# Patient Record
Sex: MALE | Race: WHITE | NOT HISPANIC OR LATINO | Employment: FULL TIME | URBAN - METROPOLITAN AREA
[De-identification: names, ages, dates, MRNs, and addresses within clinical notes are randomized per-mention and may not be internally consistent; named-entity substitution may affect disease eponyms.]

---

## 2024-01-24 DIAGNOSIS — E78.2 MIXED HYPERLIPIDEMIA: Primary | ICD-10-CM

## 2024-01-24 DIAGNOSIS — I10 PRIMARY HYPERTENSION: ICD-10-CM

## 2024-01-24 RX ORDER — ATORVASTATIN CALCIUM 10 MG/1
10 TABLET, FILM COATED ORAL DAILY
Qty: 90 TABLET | Refills: 3 | Status: SHIPPED | OUTPATIENT
Start: 2024-01-24 | End: 2024-02-23 | Stop reason: SDUPTHER

## 2024-01-24 RX ORDER — ATENOLOL AND CHLORTHALIDONE TABLET 50; 25 MG/1; MG/1
1 TABLET ORAL DAILY
Qty: 90 TABLET | Refills: 3 | Status: SHIPPED | OUTPATIENT
Start: 2024-01-24 | End: 2024-02-23 | Stop reason: SDUPTHER

## 2024-02-08 ENCOUNTER — APPOINTMENT (OUTPATIENT)
Dept: PRIMARY CARE | Facility: CLINIC | Age: 59
End: 2024-02-08

## 2024-02-23 ENCOUNTER — OFFICE VISIT (OUTPATIENT)
Dept: PRIMARY CARE | Facility: CLINIC | Age: 59
End: 2024-02-23
Payer: COMMERCIAL

## 2024-02-23 ENCOUNTER — LAB (OUTPATIENT)
Dept: LAB | Facility: LAB | Age: 59
End: 2024-02-23
Payer: COMMERCIAL

## 2024-02-23 VITALS
OXYGEN SATURATION: 95 % | DIASTOLIC BLOOD PRESSURE: 82 MMHG | HEART RATE: 55 BPM | SYSTOLIC BLOOD PRESSURE: 118 MMHG | BODY MASS INDEX: 44.81 KG/M2 | HEIGHT: 70 IN | WEIGHT: 313 LBS

## 2024-02-23 DIAGNOSIS — Z00.00 ROUTINE GENERAL MEDICAL EXAMINATION AT A HEALTH CARE FACILITY: Primary | ICD-10-CM

## 2024-02-23 DIAGNOSIS — Z13.220 LIPID SCREENING: ICD-10-CM

## 2024-02-23 DIAGNOSIS — Z12.5 SCREENING PSA (PROSTATE SPECIFIC ANTIGEN): ICD-10-CM

## 2024-02-23 DIAGNOSIS — I10 PRIMARY HYPERTENSION: ICD-10-CM

## 2024-02-23 DIAGNOSIS — E78.2 MIXED HYPERLIPIDEMIA: ICD-10-CM

## 2024-02-23 DIAGNOSIS — Z00.00 ROUTINE GENERAL MEDICAL EXAMINATION AT A HEALTH CARE FACILITY: ICD-10-CM

## 2024-02-23 DIAGNOSIS — Z13.1 SCREENING FOR DIABETES MELLITUS: ICD-10-CM

## 2024-02-23 LAB
ALBUMIN SERPL BCP-MCNC: 4.7 G/DL (ref 3.4–5)
ALP SERPL-CCNC: 42 U/L (ref 33–120)
ALT SERPL W P-5'-P-CCNC: 40 U/L (ref 10–52)
ANION GAP SERPL CALC-SCNC: 13 MMOL/L (ref 10–20)
AST SERPL W P-5'-P-CCNC: 29 U/L (ref 9–39)
BILIRUB SERPL-MCNC: 0.6 MG/DL (ref 0–1.2)
BUN SERPL-MCNC: 17 MG/DL (ref 6–23)
CALCIUM SERPL-MCNC: 10.1 MG/DL (ref 8.6–10.6)
CHLORIDE SERPL-SCNC: 102 MMOL/L (ref 98–107)
CHOLEST SERPL-MCNC: 121 MG/DL (ref 0–199)
CHOLESTEROL/HDL RATIO: 3.7
CO2 SERPL-SCNC: 34 MMOL/L (ref 21–32)
CREAT SERPL-MCNC: 0.97 MG/DL (ref 0.5–1.3)
EGFRCR SERPLBLD CKD-EPI 2021: 90 ML/MIN/1.73M*2
GLUCOSE SERPL-MCNC: 87 MG/DL (ref 74–99)
HDLC SERPL-MCNC: 32.5 MG/DL
LDLC SERPL CALC-MCNC: 71 MG/DL
NON HDL CHOLESTEROL: 89 MG/DL (ref 0–149)
POTASSIUM SERPL-SCNC: 3.9 MMOL/L (ref 3.5–5.3)
PROT SERPL-MCNC: 7.9 G/DL (ref 6.4–8.2)
PSA SERPL-MCNC: 0.94 NG/ML
SODIUM SERPL-SCNC: 145 MMOL/L (ref 136–145)
TRIGL SERPL-MCNC: 87 MG/DL (ref 0–149)
VLDL: 17 MG/DL (ref 0–40)

## 2024-02-23 PROCEDURE — 36415 COLL VENOUS BLD VENIPUNCTURE: CPT

## 2024-02-23 PROCEDURE — 80061 LIPID PANEL: CPT

## 2024-02-23 PROCEDURE — 3079F DIAST BP 80-89 MM HG: CPT | Performed by: FAMILY MEDICINE

## 2024-02-23 PROCEDURE — 84153 ASSAY OF PSA TOTAL: CPT

## 2024-02-23 PROCEDURE — 80053 COMPREHEN METABOLIC PANEL: CPT

## 2024-02-23 PROCEDURE — 99396 PREV VISIT EST AGE 40-64: CPT | Performed by: FAMILY MEDICINE

## 2024-02-23 PROCEDURE — 3074F SYST BP LT 130 MM HG: CPT | Performed by: FAMILY MEDICINE

## 2024-02-23 PROCEDURE — 1036F TOBACCO NON-USER: CPT | Performed by: FAMILY MEDICINE

## 2024-02-23 RX ORDER — ATORVASTATIN CALCIUM 10 MG/1
10 TABLET, FILM COATED ORAL DAILY
Qty: 90 TABLET | Refills: 3 | Status: SHIPPED | OUTPATIENT
Start: 2024-02-23

## 2024-02-23 RX ORDER — ATENOLOL AND CHLORTHALIDONE TABLET 50; 25 MG/1; MG/1
1 TABLET ORAL DAILY
Qty: 90 TABLET | Refills: 3 | Status: SHIPPED | OUTPATIENT
Start: 2024-02-23

## 2024-02-23 ASSESSMENT — ENCOUNTER SYMPTOMS
MUSCULOSKELETAL NEGATIVE: 1
CONSTITUTIONAL NEGATIVE: 1
HEMATOLOGIC/LYMPHATIC NEGATIVE: 1
NEUROLOGICAL NEGATIVE: 1
RESPIRATORY NEGATIVE: 1
PSYCHIATRIC NEGATIVE: 1
EYES NEGATIVE: 1
ENDOCRINE NEGATIVE: 1
CARDIOVASCULAR NEGATIVE: 1
GASTROINTESTINAL NEGATIVE: 1

## 2024-02-23 ASSESSMENT — PATIENT HEALTH QUESTIONNAIRE - PHQ9
SUM OF ALL RESPONSES TO PHQ9 QUESTIONS 1 AND 2: 0
2. FEELING DOWN, DEPRESSED OR HOPELESS: NOT AT ALL
1. LITTLE INTEREST OR PLEASURE IN DOING THINGS: NOT AT ALL

## 2024-02-23 ASSESSMENT — PAIN SCALES - GENERAL: PAINLEVEL: 0-NO PAIN

## 2024-02-23 NOTE — PROGRESS NOTES
[facility]: [facility]  PHYSICAL EXAM      Yariel Diana is a 58 y.o. male that is presenting today for Med Refill.     Subjective   57 yo CM    PT here for wellness exam.  Overdue for labs and blood pressure assesment. H/o of elevated CAC on lipitor.     Med Refill      Review of Systems   Constitutional: Negative.    HENT: Negative.     Eyes: Negative.    Respiratory: Negative.     Cardiovascular: Negative.    Gastrointestinal: Negative.    Endocrine: Negative.    Genitourinary: Negative.    Musculoskeletal: Negative.    Skin: Negative.    Neurological: Negative.    Hematological: Negative.    Psychiatric/Behavioral: Negative.         History    No past medical history on file.  No past surgical history on file.  No family history on file.  Not on File  Current Outpatient Medications on File Prior to Visit   Medication Sig Dispense Refill    atenoloL-chlorthalidone (Tenoretic) 50-25 mg tablet TAKE 1 TABLET BY MOUTH EVERY DAY 90 tablet 3    atorvastatin (Lipitor) 10 mg tablet TAKE 1 TABLET BY MOUTH EVERY DAY 90 tablet 3     No current facility-administered medications on file prior to visit.     Immunization History   Administered Date(s) Administered    Pfizer Purple Cap SARS-CoV-2 03/31/2021, 04/24/2021     Patient's medical history was reviewed and updated either before or during this encounter.    Objective   Vitals:    02/23/24 0916   BP: 118/82   Pulse: 55   SpO2: 95%      Physical Exam  Constitutional:       General: He is not in acute distress.     Appearance: Normal appearance. He is not ill-appearing.   HENT:      Right Ear: Tympanic membrane, ear canal and external ear normal. There is no impacted cerumen.      Left Ear: Tympanic membrane, ear canal and external ear normal.      Nose: Nose normal.      Mouth/Throat:      Pharynx: Oropharynx is clear. No posterior oropharyngeal erythema.   Eyes:      Extraocular Movements: Extraocular movements intact.      Pupils: Pupils are  equal, round, and reactive to light.   Neck:      Thyroid: No thyroid mass or thyroid tenderness.      Vascular: No carotid bruit.   Cardiovascular:      Rate and Rhythm: Normal rate and regular rhythm.      Pulses:           Radial pulses are 2+ on the right side and 2+ on the left side.        Dorsalis pedis pulses are 2+ on the right side and 2+ on the left side.      Heart sounds: Normal heart sounds. No murmur heard.     No friction rub. No gallop.   Pulmonary:      Effort: Pulmonary effort is normal.      Breath sounds: Normal breath sounds.   Abdominal:      General: Bowel sounds are normal.      Palpations: Abdomen is soft. There is no hepatomegaly or splenomegaly.      Tenderness: There is no abdominal tenderness.   Musculoskeletal:      Cervical back: Normal range of motion. No tenderness.      Right lower leg: No edema.      Left lower leg: No edema.      Comments: No gross abnormalities    Lymphadenopathy:      Cervical: No cervical adenopathy.      Upper Body:      Right upper body: No supraclavicular adenopathy.      Left upper body: No supraclavicular adenopathy.   Skin:     General: Skin is warm.      Capillary Refill: Capillary refill takes 2 to 3 seconds.   Neurological:      General: No focal deficit present.      Mental Status: He is alert.      Cranial Nerves: Cranial nerves 2-12 are intact.      Coordination: Coordination is intact.      Gait: Gait is intact.      Comments: No obvious neurological deficits    Psychiatric:         Mood and Affect: Mood and affect normal.            Assessment/Plan      There is no problem list on file for this patient.    Diagnoses and all orders for this visit:  Routine general medical examination at a health care facility  -     POCT UA Automated manually resulted  -     Comprehensive Metabolic Panel; Future  -     Lipid Panel; Future  -     Prostate Specific Antigen, Screen; Future  Lipid screening  -     Lipid Panel; Future  Screening for diabetes  mellitus  -     Comprehensive Metabolic Panel; Future  Screening PSA (prostate specific antigen)  -     Prostate Specific Antigen, Screen; Future  Primary hypertension  -     atenoloL-chlorthalidone (Tenoretic) 50-25 mg tablet; Take 1 tablet by mouth once daily.  Mixed hyperlipidemia  -     atorvastatin (Lipitor) 10 mg tablet; Take 1 tablet (10 mg) by mouth once daily.  150 min exercise per week,  Tdap, Shingles, flu, covid vaccines recommend.      The patient was encouraged to ensure that any/all documentation is accurate and up to date, and that our office be provided a copy in the event that anything changes.    Brody Escobedo MD

## 2024-07-05 ENCOUNTER — APPOINTMENT (OUTPATIENT)
Dept: PRIMARY CARE | Facility: CLINIC | Age: 59
End: 2024-07-05
Payer: COMMERCIAL

## 2024-12-12 ENCOUNTER — OFFICE VISIT (OUTPATIENT)
Dept: PRIMARY CARE | Facility: CLINIC | Age: 59
End: 2024-12-12
Payer: COMMERCIAL

## 2024-12-12 VITALS
HEART RATE: 63 BPM | OXYGEN SATURATION: 99 % | WEIGHT: 315 LBS | BODY MASS INDEX: 45.1 KG/M2 | SYSTOLIC BLOOD PRESSURE: 122 MMHG | HEIGHT: 70 IN | DIASTOLIC BLOOD PRESSURE: 76 MMHG

## 2024-12-12 DIAGNOSIS — Z12.5 SCREENING PSA (PROSTATE SPECIFIC ANTIGEN): ICD-10-CM

## 2024-12-12 DIAGNOSIS — E78.2 MIXED HYPERLIPIDEMIA: ICD-10-CM

## 2024-12-12 DIAGNOSIS — I10 PRIMARY HYPERTENSION: Primary | ICD-10-CM

## 2024-12-12 DIAGNOSIS — Z12.11 ENCOUNTER FOR SCREENING FOR MALIGNANT NEOPLASM OF COLON: ICD-10-CM

## 2024-12-12 PROCEDURE — 99213 OFFICE O/P EST LOW 20 MIN: CPT | Performed by: FAMILY MEDICINE

## 2024-12-12 PROCEDURE — 3078F DIAST BP <80 MM HG: CPT | Performed by: FAMILY MEDICINE

## 2024-12-12 PROCEDURE — 3008F BODY MASS INDEX DOCD: CPT | Performed by: FAMILY MEDICINE

## 2024-12-12 PROCEDURE — 3074F SYST BP LT 130 MM HG: CPT | Performed by: FAMILY MEDICINE

## 2024-12-12 PROCEDURE — 1036F TOBACCO NON-USER: CPT | Performed by: FAMILY MEDICINE

## 2024-12-12 ASSESSMENT — PAIN SCALES - GENERAL: PAINLEVEL_OUTOF10: 0-NO PAIN

## 2024-12-12 ASSESSMENT — COLUMBIA-SUICIDE SEVERITY RATING SCALE - C-SSRS
6. HAVE YOU EVER DONE ANYTHING, STARTED TO DO ANYTHING, OR PREPARED TO DO ANYTHING TO END YOUR LIFE?: NO
1. IN THE PAST MONTH, HAVE YOU WISHED YOU WERE DEAD OR WISHED YOU COULD GO TO SLEEP AND NOT WAKE UP?: NO
2. HAVE YOU ACTUALLY HAD ANY THOUGHTS OF KILLING YOURSELF?: NO

## 2024-12-12 ASSESSMENT — ENCOUNTER SYMPTOMS
LOSS OF SENSATION IN FEET: 0
PALPITATIONS: 0
OCCASIONAL FEELINGS OF UNSTEADINESS: 0
DEPRESSION: 0

## 2024-12-12 ASSESSMENT — PATIENT HEALTH QUESTIONNAIRE - PHQ9
2. FEELING DOWN, DEPRESSED OR HOPELESS: NOT AT ALL
SUM OF ALL RESPONSES TO PHQ9 QUESTIONS 1 AND 2: 0
1. LITTLE INTEREST OR PLEASURE IN DOING THINGS: NOT AT ALL

## 2024-12-12 NOTE — PROGRESS NOTES
[facility]: [facility]  E/M EVALUATION    Yariel Diana is a 59 y.o. male who presents for bp check up.    Subjective   Htn- controlled.  No interval concerns.      Review of Systems   Cardiovascular:  Negative for chest pain, palpitations and leg swelling.       Objective   Vitals:    12/12/24 1040   BP: 122/76   Pulse: 63   SpO2: 99%     Physical Exam  Constitutional:       Appearance: Normal appearance.   Cardiovascular:      Rate and Rhythm: Normal rate and regular rhythm.      Heart sounds: No murmur heard.  Pulmonary:      Effort: Pulmonary effort is normal.      Breath sounds: Normal breath sounds.   Neurological:      Mental Status: He is alert.     Cholesterol   Date Value Ref Range Status   02/23/2024 121 0 - 199 mg/dL Final     Comment:           Age      Desirable   Borderline High   High     0-19 Y     0 - 169       170 - 199     >/= 200    20-24 Y     0 - 189       190 - 224     >/= 225         >24 Y     0 - 199       200 - 239     >/= 240   **All ranges are based on fasting samples. Specific   therapeutic targets will vary based on patient-specific   cardiac risk.    Pediatric guidelines reference:Pediatrics 2011, 128(S5).Adult guidelines reference: NCEP ATPIII Guidelines,ANUSHA 2001, 258:2486-97    Venipuncture immediately after or during the administration of Metamizole may lead to falsely low results. Testing should be performed immediately prior to Metamizole dosing.     Triglycerides   Date Value Ref Range Status   02/23/2024 87 0 - 149 mg/dL Final     Comment:        Age         Desirable   Borderline High   High     Very High   0 D-90 D    19 - 174         ----         ----        ----  91 D- 9 Y     0 -  74        75 -  99     >/= 100      ----    10-19 Y     0 -  89        90 - 129     >/= 130      ----    20-24 Y     0 - 114       115 - 149     >/= 150      ----         >24 Y     0 - 149       150 - 199    200- 499    >/= 500    Venipuncture immediately after or during  the administration of Metamizole may lead to falsely low results. Testing should be performed immediately prior to Metamizole dosing.     HDL-Cholesterol   Date Value Ref Range Status   02/23/2024 32.5 mg/dL Final     Comment:       Age       Very Low   Low     Normal    High    0-19 Y    < 35      < 40     40-45     ----  20-24 Y    ----     < 40      >45      ----        >24 Y      ----     < 40     40-60      >60       LDL Calculated   Date Value Ref Range Status   02/23/2024 71 <=99 mg/dL Final     Comment:                                 Near   Borderline      AGE      Desirable  Optimal    High     High     Very High     0-19 Y     0 - 109     ---    110-129   >/= 130     ----    20-24 Y     0 - 119     ---    120-159   >/= 160     ----      >24 Y     0 -  99   100-129  130-159   160-189     >/=190       Cholesterol/HDL Ratio   Date Value Ref Range Status   02/23/2024 3.7  Final     Comment:       Ref Values  Desirable  < 3.4  High Risk  > 5.0     Glucose   Date Value Ref Range Status   02/23/2024 87 74 - 99 mg/dL Final     Sodium   Date Value Ref Range Status   02/23/2024 145 136 - 145 mmol/L Final     Potassium   Date Value Ref Range Status   02/23/2024 3.9 3.5 - 5.3 mmol/L Final     ALT   Date Value Ref Range Status   02/23/2024 40 10 - 52 U/L Final     Comment:     Patients treated with Sulfasalazine may generate falsely decreased results for ALT.     eGFR   Date Value Ref Range Status   02/23/2024 90 >60 mL/min/1.73m*2 Final     Comment:     Calculations of estimated GFR are performed using the 2021 CKD-EPI Study Refit equation without the race variable for the IDMS-Traceable creatinine methods.  https://jasn.asnjournals.org/content/early/2021/09/22/ASN.8990670119     Thyroid Stimulating Hormone   Date Value Ref Range Status   08/08/2018 5.75 (H) 0.27 - 4.20 MIU/L Final     Comment:     THIS TSH ASSAY HAS A FUNCTIONAL SENSITIVITY OF 0.01 MIU/L. THIS MEETS   REQUIREMENTS OF A 3RD GENERATION HIGH SENSITIVE  TSH ASSAY.  Performed at [address] [address]         Assessment/Plan      Patient Active Problem List   Diagnosis   • Primary hypertension       Diagnoses and all orders for this visit:  Primary hypertension  -     Comprehensive Metabolic Panel; Future  Mixed hyperlipidemia  -     Comprehensive Metabolic Panel; Future  -     Lipid Panel; Future  Screening PSA (prostate specific antigen)  -     Prostate Specific Antigen, Screen; Future  Encounter for screening for malignant neoplasm of colon  -     Colonoscopy Screening; Average Risk Patient; Future      The patient was encouraged to ensure that any/all documentation is accurate and up to date, and that our office be provided a copy in the event that anything changes.         Brody Escobedo MD

## 2024-12-16 DIAGNOSIS — Z12.11 COLON CANCER SCREENING: Primary | ICD-10-CM

## 2024-12-16 RX ORDER — POLYETHYLENE GLYCOL 3350, SODIUM SULFATE ANHYDROUS, SODIUM BICARBONATE, SODIUM CHLORIDE, POTASSIUM CHLORIDE 236; 22.74; 6.74; 5.86; 2.97 G/4L; G/4L; G/4L; G/4L; G/4L
4000 POWDER, FOR SOLUTION ORAL ONCE
Qty: 4000 ML | Refills: 0 | Status: SHIPPED | OUTPATIENT
Start: 2024-12-16 | End: 2024-12-16

## 2024-12-20 ENCOUNTER — OFFICE VISIT (OUTPATIENT)
Dept: URGENT CARE | Age: 59
End: 2024-12-20
Payer: COMMERCIAL

## 2024-12-20 ENCOUNTER — ANCILLARY PROCEDURE (OUTPATIENT)
Dept: URGENT CARE | Age: 59
End: 2024-12-20
Payer: COMMERCIAL

## 2024-12-20 VITALS
SYSTOLIC BLOOD PRESSURE: 149 MMHG | RESPIRATION RATE: 16 BRPM | OXYGEN SATURATION: 97 % | WEIGHT: 300 LBS | BODY MASS INDEX: 43.05 KG/M2 | HEART RATE: 61 BPM | TEMPERATURE: 97.9 F | DIASTOLIC BLOOD PRESSURE: 89 MMHG

## 2024-12-20 DIAGNOSIS — M25.571 ACUTE RIGHT ANKLE PAIN: Primary | ICD-10-CM

## 2024-12-20 DIAGNOSIS — M79.671 RIGHT FOOT PAIN: ICD-10-CM

## 2024-12-20 DIAGNOSIS — M25.571 ACUTE RIGHT ANKLE PAIN: ICD-10-CM

## 2024-12-20 PROCEDURE — 3077F SYST BP >= 140 MM HG: CPT

## 2024-12-20 PROCEDURE — 3079F DIAST BP 80-89 MM HG: CPT

## 2024-12-20 PROCEDURE — 99203 OFFICE O/P NEW LOW 30 MIN: CPT

## 2024-12-20 PROCEDURE — 73610 X-RAY EXAM OF ANKLE: CPT | Mod: RIGHT SIDE

## 2024-12-20 PROCEDURE — 73630 X-RAY EXAM OF FOOT: CPT | Mod: RIGHT SIDE

## 2024-12-20 ASSESSMENT — ENCOUNTER SYMPTOMS
WEAKNESS: 0
NUMBNESS: 0
LIGHT-HEADEDNESS: 0
ARTHRALGIAS: 1
FEVER: 0
HEADACHES: 0
NECK PAIN: 0
WOUND: 0
JOINT SWELLING: 1
CHILLS: 0

## 2024-12-20 NOTE — PROGRESS NOTES
Subjective   Patient ID: Yariel Diana is a 59 y.o. male. They present today with a chief complaint of Foot Injury (Right foot slipped and fell 6 days ago).    History of Present Illness  See MDM       History provided by:  Patient   used: No    Foot Injury   Pertinent negatives include no numbness.       Past Medical History  Allergies as of 12/20/2024    (No Known Allergies)       (Not in a hospital admission)       No past medical history on file.    No past surgical history on file.     reports that he has never smoked. He has never used smokeless tobacco. He reports current alcohol use. He reports that he does not use drugs.    Review of Systems  Review of Systems   Constitutional:  Negative for chills and fever.   Musculoskeletal:  Positive for arthralgias and joint swelling. Negative for neck pain.   Skin:  Negative for wound.   Neurological:  Negative for weakness, light-headedness, numbness and headaches.   All other systems reviewed and are negative.                                 Objective    Vitals:    12/20/24 1028   BP: 149/89   BP Location: Left arm   Patient Position: Sitting   BP Cuff Size: Large adult   Pulse: 61   Resp: 16   Temp: 36.6 °C (97.9 °F)   TempSrc: Oral   SpO2: 97%   Weight: 136 kg (300 lb)     No LMP for male patient.    Physical Exam  Vitals and nursing note reviewed.   Constitutional:       General: He is not in acute distress.     Appearance: He is normal weight. He is not ill-appearing, toxic-appearing or diaphoretic.   HENT:      Head: Normocephalic and atraumatic.      Nose: Nose normal.      Mouth/Throat:      Mouth: Mucous membranes are moist.      Pharynx: No oropharyngeal exudate or posterior oropharyngeal erythema.   Eyes:      General: No scleral icterus.        Right eye: No discharge.         Left eye: No discharge.      Extraocular Movements: Extraocular movements intact.      Conjunctiva/sclera: Conjunctivae normal.      Pupils: Pupils are equal,  round, and reactive to light.   Cardiovascular:      Rate and Rhythm: Normal rate and regular rhythm.      Pulses: Normal pulses.   Pulmonary:      Effort: Pulmonary effort is normal. No respiratory distress.   Abdominal:      General: Abdomen is flat.   Musculoskeletal:         General: Swelling, tenderness and signs of injury present. No deformity.      Cervical back: Normal range of motion and neck supple. No rigidity or tenderness.      Right lower leg: No edema.      Left lower leg: No edema.      Comments: Tenderness to palpation of medial malleolus and heel which is mild without bony deformity.  No tenderness to palpation of lateral malleolus.  Mild generalized tenderness of the dorsal aspect of the foot without bony deformity.  Mild generalized swelling.  No ecchymosis.  No bony step-off.  Range of motion of the foot and ankle is normal although weightbearing painful.  Negative holman test.  Neurovascularly intact.  No tenderness to palpation of the tib-fib or knee.   Skin:     General: Skin is warm and dry.      Capillary Refill: Capillary refill takes less than 2 seconds.   Neurological:      General: No focal deficit present.      Mental Status: He is alert.   Psychiatric:         Mood and Affect: Mood normal.         Behavior: Behavior normal.         Procedures    Point of Care Test & Imaging Results from this visit  No results found for this visit on 12/20/24.   No results found.    Diagnostic study results (if any) were reviewed by Ashley Yee PA-C.    Assessment/Plan   Allergies, medications, history, and pertinent labs/EKGs/Imaging reviewed by Ashley Yee PA-C.     Medical Decision Making  59-year-old male with past medical history of hypertension presents with complaint of right foot pain.  Patient reports that he was walking down his basement steps carrying drywall last weekend.  He slipped on the steps and felt he twisted his foot or ankle.  He had some mild pain however did not  seem severe.  Pain seems to have worsened instead of improved and now he is also having pain of the dorsal aspect of the foot in addition to the heel and medial malleolus.  Exam as above.  Neurovascularly intact.  No features suggestive of compartment syndrome, neurovascular compromise, joint instability, complete ligamentous or tendinous disruption.  X-ray of right foot and ankle with no acute finding.  Doubt occult fracture with negative x-ray at this time from injury.  He has a walking boto and crutches at home.  Will refer to orthopedic for persistent symptoms.  Encouraged follow-up with primary care provider.  Discussed indications for presentation to emergency department.  Discharged good condition agreeable to plan as discussed.     Orders and Diagnoses  Diagnoses and all orders for this visit:  Acute right ankle pain  -     XR ankle right 3+ views; Future  -     Referral to Orthopaedic Surgery; Future  Right foot pain  -     XR foot right 3+ views; Future      Medical Admin Record      Patient disposition: Home    Electronically signed by Ashley Yee PA-C  1:40 PM

## 2025-01-09 ENCOUNTER — APPOINTMENT (OUTPATIENT)
Dept: PRIMARY CARE | Facility: CLINIC | Age: 60
End: 2025-01-09
Payer: COMMERCIAL

## 2025-01-20 ASSESSMENT — ENCOUNTER SYMPTOMS
CONSTITUTIONAL NEGATIVE: 1
ARTHRALGIAS: 1
CARDIOVASCULAR NEGATIVE: 1
RESPIRATORY NEGATIVE: 1
MYALGIAS: 1

## 2025-01-20 NOTE — PROGRESS NOTES
"New patient  History Of Present Illness  Yariel Diana is a 59 y.o. male presenting with RIGHT foot/ankle pain. Pt states that around the beginning of December, while carrying down in the basement when he \"slipped\". States that the following morning he had pain and difficulty with movement. States that pain has been moving around. He started having pain in the heel which then moved to the toes and then wrapped to the top of the foot. Pt then three weeks ago, had a flat tire that he had to change that re-aggravated his foot/ankle pain. Presents wearing an over the counter ankle brace with slight relief as well as tape underneath it. Pt did go to [facility] on 12/20 for initial evaluation and had xrays completed. Pain located on the top of the foot, in to the lateral aspect of the ankle \"knuckle\" and then in to the heel. Pain with all range of motion which increases throughout the day the longer he is on his feet. Slight swelling present but notes nothing like it was previously. Denies any numbness or tingling. 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 Rates current pain as a 3-4/10 and states that at its worst a 10/10. Mentions that when pain first occurred he was using a walker to help him around the house.  We discussed treatment options.  Upon exam patient has indications of a ankle sprain specifically a high ankle sprain with concern for syndesmotic disruption.  Patient also has consideration for an occult fracture.  As such after discussion we will order an MRI to evaluate for underlying soft tissue injury or occult fracture and will place patient into a tall walking boot.  Patient can follow-up after MRI and we can adjust treatment as indicated at that time with consideration of referral to orthopedic surgery as necessary.  Patient verbalizes understanding and agreement with plan of care.    Past Medical History  He has no past medical history on file.    Surgical " "History  He has no past surgical history on file.     Social History  He reports that he has never smoked. He has never used smokeless tobacco. He reports current alcohol use. He reports that he does not use drugs.    Family History  No family history on file.     Allergies  Patient has no known allergies.    Review of Systems  Review of Systems   Constitutional: Negative.    Respiratory: Negative.     Cardiovascular: Negative.    Musculoskeletal:  Positive for arthralgias and myalgias.   All other systems reviewed and are negative.    Last Recorded Vitals  /78 (BP Location: Right arm, Patient Position: Sitting, BP Cuff Size: Large adult)   Pulse 63   Ht 1.778 m (5' 10\")   Wt 136 kg (300 lb)   BMI 43.05 kg/m²      The , MARIELENA ARRIAGA was present during today's visit and not limited to physical examination!    Examination:  Right Ankle and/or Foot  Edema: Positive.   Ecchymosis/Bruising: Negative.  Percussion Test: Positive           Tuning Fork Test: Negative.    Orientation:   Positive  Asymmetrical,because of the swelling.          ROM:   Positive, Decreased plantar flexion and dorsiflexion due to pain            Muscle Strength: Positive Decreased due to pain  +3/+5 Plantar Flexion,   +3/+5  Dorsi Flexion,      +3/+5 Inversion  +3/+5 Eversion        +3/+5 Plantarflexion in combination with inversion  +3/+5 Plantarflexion in combination with eversion          +3/+5 Dorsiflexion in combination with inversion (Posterior Tibialis)  +3/+5 Dorsiflexion in combination with eversion (Peroneal Brevis)  +3/+5 Dorsiflexion in combination with eversion and flexion of great toe (Peroneal Longus).            Palpation:   Positive, Painful and Tender to Palpation Right lateral malleolus, anterior Talus, dorsal aspect of ankle, heel           Vascular:   +2/+4 Dorsalis Pedis  +2/+4 Posterior Tibialis  Capillary Refill < 2 Seconds.        Leg/Ankle/Foot - Ankle Impingement:  Posterior Ankle Impingement Test: " Negative.   Anterior Ankle Impingement Test: Positive.          Leg/Ankle/Foot - Ankle Instability:  Flexibility Test:  Positive.   Anterior Drawer Test:  Negative.   Talar Tilt Test:  Positive.   External Rotation Kleiger Plantar Flexion Test: Positive  External Rotation Kleiger Dorsiflexion Test:  Positive  Squeeze Test:  Positive  Dorsiflexion Test:  Negative.   Posterior Tibial Instability Test: Negative.  Peroneal Tendon Instability Test:  Negative.  Horizontal Squeeze Test:  Positive.   Vertical Squeeze Test:  Positive.   Standing/Walking Test:  Positive, Painful.         Leg/Ankle/Foot - DVT:  Taya's Sign: Negative.         Leg/Ankle/Foot - Forefoot:  Strunsky Test:  Negative.   Gaenslen Maneuver Test:  Negative.   Metatarsal Tap Test: Negative.     Crepitation Test:  Negative.         Leg/Ankle/Foot - Hindfoot Achilles/Calcaneus:  Corrigan Compression Test: Negative.   Hoffa Sign: Negative.   Achilles Tendon Tap Test: Negative.   Heel Compression Test: Negative.         Leg/Ankle/Foot - Nerve Irritation:  Evy Sign: Negative.   Digital Nerve Stretch Test: Negative.   Tinel Sign: Negative.   Tourniquet Sign: Negative.         Feet/Foot:   Positive BILATERAL  Valgus foot     Imaging and Diagnostics Review:  Plain radiographs ordered during today's visit and independently reviewed.    Assessment   1. High ankle sprain, right, initial encounter    2. Right foot pain    3. Calcaneal spur, right    4. 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 Acute right ankle pain    6. Sprain of right ankle, initial encounter    7. Peroneal tendinitis, right    8. Tendinitis of right flexor hallucis longus        Plan   Treatment or Intervention:  May use PRICE therapy as needed.  Start into Physical Therapy 1-2 times a week for 8-10 weeks with manual therapy as well as dry needling and IASTM  Stressed the importance of wearing shoes with good stability control to help with the biomechanics affecting the lower  legs  Stressed the importance of wearing full foot insoles to help with the biomechanics affecting the lower legs  Recommendation over-the-counter calcium 500mg, 3 times a day with vitamin-D3 0425-7035+ units a day with food as well as a daily multivitamin  Recommendation over-the-counter Move Free for joint health  May take OTC Tylenol Extra Strength or OTC Tylenol Arthritis, taking one every 6-8 hours with food as needed for pain management.  Patient advised regarding the risk and/or potential adverse reactions and/or side effects of any prescribed medications along with any over-the-counter medications or any supplements used. Patient advised to seek immediate medical care if any adverse reactions occur. The patient and/or patient(s) parent(s) verbalized their understanding.  Discussed in detail with the patient to the level of their understanding the possibility in the future of regenerative injections versus corticosteroids injections  MRI of RIGHT ANKLE to rule out tendon vs ligament vs tear vs fracture vs other, MSK to read.  Patient was given a TALL WALKING BOOT brace for their RIGHT FOOT/ANKLE injury/condition. The patient is ambulatory with or without aid and feels more stable with the brace on. The brace definitely helps improve their function as well as stability. Verbal and written instructions for the use, wear schedule, cleaning and application of this brace were given. Patient was instructed that should the brace result in increased pain, decreased sensation, numbness and/or tingling, increased swelling, or an overall worsening of their medical condition; to please contact our office immediately. Orthotic/brace management and training was provided for skin care, modifications due to healing tissues, edema changes, interruption in skin integrity and safety precautions with the Orthosis/brace.     Diagnostic studies:    XR foot right 3+ views, XR ankle right 3+ views  Narrative: Interpreted By:  Marquise  Jorge,   STUDY:  XR FOOT RIGHT 3+ VIEWS; XR ANKLE RIGHT 3+ VIEWS      INDICATION:  Signs/Symptoms:injury.      COMPARISON:  August 8, 2018      ACCESSION NUMBER(S):  [id number]; [id number]      ORDERING CLINICIAN:  FLORA TORRES      FINDINGS:  Previous curettage and packing of a previous right distal tibial  epiphyseal and metaphyseal lesion with an unchanged appearance.      Degenerative changes of the right midfoot similar to the prior exam.      Calcaneal spur unchanged.      No evidence of acute fracture or malalignment. Lateral soft tissue  swelling.      Impression: Lateral malleolar soft tissue swelling without evidence of right foot  or ankle fracture.      Previous curettage and packing of a distal tibial lesion unchanged  from 2018 study.      Signed by: Jorge Camarillo 12/20/2024 11:48 AM  Dictation workstation:   [id number]     Activity Instructions, Restrictions, and Accommodations:      Consultations/Referrals:  Physical therapy    Follow-up:  Follow up after MRI of RIGHT ankle  Total appointment time _45__ minutes. Greater than 50% spent counseling patient on results of physical exam, treatment options as well as reasons for ordered imaging and anticipated treatment for possible results, need for PT and expected outcomes, as well as discussing possible medications.     Paul Jimenez CNP

## 2025-01-20 NOTE — PATIENT INSTRUCTIONS
May use PRICE therapy as needed.  Start into Physical Therapy 1-2 times a week for 8-10 weeks with manual therapy as well as dry needling and IASTM  Stressed the importance of wearing shoes with good stability control to help with the biomechanics affecting the lower legs  Stressed the importance of wearing full foot insoles to help with the biomechanics affecting the lower legs  Recommendation over-the-counter calcium 500mg, 3 times a day with vitamin-D3 1847-7141+ units a day with food as well as a daily multivitamin  Recommendation over-the-counter Move Free for joint health  May take OTC Tylenol Extra Strength or OTC Tylenol Arthritis, taking one every 6-8 hours with food as needed for pain management.  Patient advised regarding the risk and/or potential adverse reactions and/or side effects of any prescribed medications along with any over-the-counter medications or any supplements used. Patient advised to seek immediate medical care if any adverse reactions occur. The patient and/or patient(s) parent(s) verbalized their understanding.  Discussed in detail with the patient to the level of their understanding the possibility in the future of regenerative injections versus corticosteroids injections  MRI of RIGHT ANKLE to rule out tendon vs ligament vs tear vs fracture vs other, MSK to read.  Patient was given a WALKING BOOT brace for their RIGHT FOOT/ANKLE injury/condition. The patient is ambulatory with or without aid and feels more stable with the brace on. The brace definitely helps improve their function as well as stability. Verbal and written instructions for the use, wear schedule, cleaning and application of this brace were given. Patient was instructed that should the brace result in increased pain, decreased sensation, numbness and/or tingling, increased swelling, or an overall worsening of their medical condition; to please contact our office immediately. Orthotic/brace management and training was  provided for skin care, modifications due to healing tissues, edema changes, interruption in skin integrity and safety precautions with the Orthosis/brace.   Follow up after MRI of RIGHT ankle    You have been ordered an MRI of the RIGHT ANKLE. Once you contact scheduling at [phone] and obtain the date and time of your MRI, contact our office at [phone] to schedule your follow-up appointment to review your results.

## 2025-01-21 ENCOUNTER — OFFICE VISIT (OUTPATIENT)
Dept: SPORTS MEDICINE | Facility: CLINIC | Age: 60
End: 2025-01-21
Payer: COMMERCIAL

## 2025-01-21 VITALS
BODY MASS INDEX: 42.95 KG/M2 | WEIGHT: 300 LBS | HEIGHT: 70 IN | SYSTOLIC BLOOD PRESSURE: 124 MMHG | HEART RATE: 63 BPM | DIASTOLIC BLOOD PRESSURE: 78 MMHG

## 2025-01-21 DIAGNOSIS — M25.571 ACUTE RIGHT ANKLE PAIN: ICD-10-CM

## 2025-01-21 DIAGNOSIS — S93.491A HIGH ANKLE SPRAIN, RIGHT, INITIAL ENCOUNTER: Primary | ICD-10-CM

## 2025-01-21 DIAGNOSIS — M19.071: ICD-10-CM

## 2025-01-21 DIAGNOSIS — M76.71 PERONEAL TENDINITIS, RIGHT: ICD-10-CM

## 2025-01-21 DIAGNOSIS — S93.401A SPRAIN OF RIGHT ANKLE, INITIAL ENCOUNTER: ICD-10-CM

## 2025-01-21 DIAGNOSIS — M79.671 RIGHT FOOT PAIN: ICD-10-CM

## 2025-01-21 DIAGNOSIS — M77.31 CALCANEAL SPUR, RIGHT: ICD-10-CM

## 2025-01-21 DIAGNOSIS — M77.51 TENDINITIS OF RIGHT FLEXOR HALLUCIS LONGUS: ICD-10-CM

## 2025-01-21 PROCEDURE — 1036F TOBACCO NON-USER: CPT | Performed by: NURSE PRACTITIONER

## 2025-01-21 PROCEDURE — 99214 OFFICE O/P EST MOD 30 MIN: CPT | Performed by: NURSE PRACTITIONER

## 2025-01-21 PROCEDURE — 3008F BODY MASS INDEX DOCD: CPT | Performed by: NURSE PRACTITIONER

## 2025-01-21 PROCEDURE — L4361 PNEUMA/VAC WALK BOOT PRE OTS: HCPCS | Performed by: NURSE PRACTITIONER

## 2025-01-21 PROCEDURE — 3074F SYST BP LT 130 MM HG: CPT | Performed by: NURSE PRACTITIONER

## 2025-01-21 PROCEDURE — 3078F DIAST BP <80 MM HG: CPT | Performed by: NURSE PRACTITIONER

## 2025-01-21 PROCEDURE — 99204 OFFICE O/P NEW MOD 45 MIN: CPT | Performed by: NURSE PRACTITIONER

## 2025-01-21 ASSESSMENT — PATIENT HEALTH QUESTIONNAIRE - PHQ9
SUM OF ALL RESPONSES TO PHQ9 QUESTIONS 1 AND 2: 0
1. LITTLE INTEREST OR PLEASURE IN DOING THINGS: NOT AT ALL
2. FEELING DOWN, DEPRESSED OR HOPELESS: NOT AT ALL

## 2025-01-21 ASSESSMENT — ENCOUNTER SYMPTOMS
LOSS OF SENSATION IN FEET: 0
DEPRESSION: 0
OCCASIONAL FEELINGS OF UNSTEADINESS: 0

## 2025-01-21 ASSESSMENT — PAIN SCALES - GENERAL: PAINLEVEL_OUTOF10: 3

## 2025-01-22 ENCOUNTER — APPOINTMENT (OUTPATIENT)
Dept: OPERATING ROOM | Facility: HOSPITAL | Age: 60
End: 2025-01-22
Payer: COMMERCIAL

## 2025-01-22 ENCOUNTER — PROCEDURE VISIT (OUTPATIENT)
Dept: PRIMARY CARE | Facility: CLINIC | Age: 60
End: 2025-01-22
Payer: COMMERCIAL

## 2025-01-22 VITALS
WEIGHT: 315 LBS | SYSTOLIC BLOOD PRESSURE: 150 MMHG | BODY MASS INDEX: 45.77 KG/M2 | OXYGEN SATURATION: 98 % | HEART RATE: 59 BPM | DIASTOLIC BLOOD PRESSURE: 80 MMHG

## 2025-01-22 DIAGNOSIS — L91.8 SKIN TAG: Primary | ICD-10-CM

## 2025-01-22 PROCEDURE — 11200 RMVL SKIN TAGS UP TO&INC 15: CPT | Performed by: FAMILY MEDICINE

## 2025-01-22 ASSESSMENT — COLUMBIA-SUICIDE SEVERITY RATING SCALE - C-SSRS
1. IN THE PAST MONTH, HAVE YOU WISHED YOU WERE DEAD OR WISHED YOU COULD GO TO SLEEP AND NOT WAKE UP?: NO
2. HAVE YOU ACTUALLY HAD ANY THOUGHTS OF KILLING YOURSELF?: NO
6. HAVE YOU EVER DONE ANYTHING, STARTED TO DO ANYTHING, OR PREPARED TO DO ANYTHING TO END YOUR LIFE?: NO

## 2025-01-22 ASSESSMENT — PAIN SCALES - GENERAL: PAINLEVEL_OUTOF10: 0-NO PAIN

## 2025-01-22 NOTE — PROGRESS NOTES
Patient ID: Yariel Diana is a 59 y.o. male.    Skin Tag Removal    Date/Time: 1/22/2025 4:30 PM    Performed by: Brody Escobedo MD  Authorized by: Brody Escobedo MD    Consent:     Consent obtained:  Verbal    Consent given by:  Patient    Risks, benefits, and alternatives were discussed: yes      Risks discussed:  Bleeding, infection and poor cosmetic result    Alternatives discussed:  No treatment  Indications:     Indications:  Eyelid  Pre-procedure details:     Skin preparation:  Antiseptic wash  Sedation:     Sedation type:  None  Anesthesia:     Anesthesia method:  None  Procedure specific details:      Shave skin tags eyelid x 3.

## 2025-02-04 ENCOUNTER — HOSPITAL ENCOUNTER (OUTPATIENT)
Dept: RADIOLOGY | Facility: HOSPITAL | Age: 60
Discharge: HOME | End: 2025-02-04
Payer: COMMERCIAL

## 2025-02-04 DIAGNOSIS — M79.671 RIGHT FOOT PAIN: ICD-10-CM

## 2025-02-04 DIAGNOSIS — M77.31 CALCANEAL SPUR, RIGHT: ICD-10-CM

## 2025-02-04 DIAGNOSIS — M25.571 ACUTE RIGHT ANKLE PAIN: ICD-10-CM

## 2025-02-04 DIAGNOSIS — S93.401A SPRAIN OF RIGHT ANKLE, INITIAL ENCOUNTER: ICD-10-CM

## 2025-02-04 DIAGNOSIS — M77.51 TENDINITIS OF RIGHT FLEXOR HALLUCIS LONGUS: ICD-10-CM

## 2025-02-04 DIAGNOSIS — M19.071: ICD-10-CM

## 2025-02-04 DIAGNOSIS — M76.71 PERONEAL TENDINITIS, RIGHT: ICD-10-CM

## 2025-02-04 DIAGNOSIS — S93.491A HIGH ANKLE SPRAIN, RIGHT, INITIAL ENCOUNTER: ICD-10-CM

## 2025-02-04 PROCEDURE — 73721 MRI JNT OF LWR EXTRE W/O DYE: CPT | Mod: RT

## 2025-02-04 PROCEDURE — 73721 MRI JNT OF LWR EXTRE W/O DYE: CPT | Mod: RIGHT SIDE | Performed by: RADIOLOGY

## 2025-02-07 ENCOUNTER — OFFICE VISIT (OUTPATIENT)
Dept: SPORTS MEDICINE | Facility: CLINIC | Age: 60
End: 2025-02-07
Payer: COMMERCIAL

## 2025-02-07 VITALS
SYSTOLIC BLOOD PRESSURE: 128 MMHG | DIASTOLIC BLOOD PRESSURE: 78 MMHG | HEIGHT: 70 IN | BODY MASS INDEX: 42.95 KG/M2 | WEIGHT: 300 LBS

## 2025-02-07 DIAGNOSIS — S93.491D SPRAIN OF ANTERIOR TALOFIBULAR LIGAMENT, RIGHT, SUBSEQUENT ENCOUNTER: ICD-10-CM

## 2025-02-07 DIAGNOSIS — M25.571 ACUTE RIGHT ANKLE PAIN: ICD-10-CM

## 2025-02-07 DIAGNOSIS — M77.31 CALCANEAL SPUR, RIGHT: ICD-10-CM

## 2025-02-07 DIAGNOSIS — M76.71 PERONEAL TENDINITIS, RIGHT: ICD-10-CM

## 2025-02-07 DIAGNOSIS — S93.411D SPRAIN OF CALCANEOFIBULAR LIGAMENT OF RIGHT ANKLE, SUBSEQUENT ENCOUNTER: ICD-10-CM

## 2025-02-07 DIAGNOSIS — M84.374D: ICD-10-CM

## 2025-02-07 DIAGNOSIS — M79.671 RIGHT FOOT PAIN: ICD-10-CM

## 2025-02-07 DIAGNOSIS — S93.491D HIGH ANKLE SPRAIN, RIGHT, SUBSEQUENT ENCOUNTER: ICD-10-CM

## 2025-02-07 DIAGNOSIS — M77.51 TENDINITIS OF RIGHT FLEXOR HALLUCIS LONGUS: ICD-10-CM

## 2025-02-07 DIAGNOSIS — M19.071: ICD-10-CM

## 2025-02-07 PROCEDURE — 99214 OFFICE O/P EST MOD 30 MIN: CPT | Performed by: NURSE PRACTITIONER

## 2025-02-07 PROCEDURE — 3078F DIAST BP <80 MM HG: CPT | Performed by: NURSE PRACTITIONER

## 2025-02-07 PROCEDURE — 3074F SYST BP LT 130 MM HG: CPT | Performed by: NURSE PRACTITIONER

## 2025-02-07 PROCEDURE — 3008F BODY MASS INDEX DOCD: CPT | Performed by: NURSE PRACTITIONER

## 2025-02-07 PROCEDURE — 1036F TOBACCO NON-USER: CPT | Performed by: NURSE PRACTITIONER

## 2025-02-07 ASSESSMENT — PAIN SCALES - GENERAL: PAINLEVEL_OUTOF10: 2

## 2025-02-07 ASSESSMENT — ENCOUNTER SYMPTOMS
ARTHRALGIAS: 1
CONSTITUTIONAL NEGATIVE: 1
CARDIOVASCULAR NEGATIVE: 1
MYALGIAS: 1
OCCASIONAL FEELINGS OF UNSTEADINESS: 0
DEPRESSION: 0
LOSS OF SENSATION IN FEET: 0
RESPIRATORY NEGATIVE: 1

## 2025-02-07 NOTE — PATIENT INSTRUCTIONS
May use PRICE therapy as needed.  Start and continue Physical Therapy 1-2 times a week for 8-10 weeks with manual therapy as well as dry needling and IASTM  Again stressed the importance of wearing shoes with good stability control to help with the biomechanics affecting the lower legs  Again stressed the importance of wearing full foot insoles to help with the biomechanics affecting the lower legs  Recommendation over-the-counter calcium 500mg, 3 times a day with vitamin-D3 7309-6793+ units a day with food as well as a daily multivitamin  Recommendation over-the-counter Move Free for joint health  May take OTC Tylenol Extra Strength or OTC Tylenol Arthritis, taking one every 6-8 hours with food as needed for pain management.  Patient advised regarding the risk and/or potential adverse reactions and/or side effects of any prescribed medications along with any over-the-counter medications or any supplements used. Patient advised to seek immediate medical care if any adverse reactions occur. The patient and/or patient(s) parent(s) verbalized their understanding.  Discussed in detail with the patient to the level of their understanding the possibility in the future of regenerative injections versus corticosteroids injections  Reviewed RIGHT ANKLE MRI in detail with the patient and/or patients parent/legal guardian to their level of understanding; a copy of these results were provided to the patient and/or patients parent/legal guardian at the time of this office visit.   Follow up 6-8 weeks

## 2025-02-07 NOTE — PROGRESS NOTES
Established patient  History Of Present Illness  Yariel Diana is a 59 y.o. male presenting for his MRI follow up of his RIGHT foot/ankle. Patient does not present wearing tall walking boot as previously fitted for and instructed to wear. Notes that he discontinued wearing it two days ago due to it causing pain in his lower back and hip, but pain has improved. Since last visit, patient states that he feels slightly improved. Rates current pain as a 2-3/10. He does have his initial evaluation with physical therapy scheduled for 2/11.  We reviewed patient MRI results in detail.  Patient verbalizes understanding of results.  We discussed treatment options and patient may transition out of his tall walking boot as he states that it has been causing him some discomfort in his lower back and hip.  Patient can transition into a stabilizing ankle brace which he states he has at home.  Also recommended the patient wear a boot as opposed to a shoe for the added support.  Patient will start physical therapy as scheduled and can follow-up in 6 to 8 weeks for reevaluation and we can consider further treatment options at that time such as injections or other therapeutic options.  Patient verbalizes understanding and agreement with plan of care.    Past Medical History  He has no past medical history on file.    Surgical History  He has no past surgical history on file.     Social History  He reports that he has never smoked. He has never used smokeless tobacco. He reports current alcohol use. He reports that he does not use drugs.    Family History  No family history on file.     Allergies  Patient has no known allergies.    Review of Systems  Review of Systems   Constitutional: Negative.    Respiratory: Negative.     Cardiovascular: Negative.    Musculoskeletal:  Positive for arthralgias and myalgias.   All other systems reviewed and are negative.    Last Recorded Vitals  /78 (BP Location: Right arm, Patient  "Position: Sitting, BP Cuff Size: Large adult)   Ht 1.778 m (5' 10\")   Wt 136 kg (300 lb)   BMI 43.05 kg/m²      The , MARIELENA ARRIAGA was present during today's visit and not limited to physical examination!    Examination:  Right Ankle and/or Foot  Edema: Positive.   Ecchymosis/Bruising: Negative.  Percussion Test: Positive           Tuning Fork Test: Negative.    Orientation:   Positive  Asymmetrical,because of the swelling.          ROM:   Positive, Decreased plantar flexion and dorsiflexion due to pain            Muscle Strength: Positive Decreased due to pain  +4/+5 Plantar Flexion,   +4/+5  Dorsi Flexion,      +4/+5 Inversion  +4/+5 Eversion        +4/+5 Plantarflexion in combination with inversion  +4/+5 Plantarflexion in combination with eversion          +4/+5 Dorsiflexion in combination with inversion (Posterior Tibialis)  +4/+5 Dorsiflexion in combination with eversion (Peroneal Brevis)  +4/+5 Dorsiflexion in combination with eversion and flexion of great toe (Peroneal Longus).            Palpation:   Positive, Painful and Tender to Palpation Right lateral malleolus, anterior Talus, dorsal aspect of ankle, heel           Vascular:   +2/+4 Dorsalis Pedis  +2/+4 Posterior Tibialis  Capillary Refill < 2 Seconds.        Leg/Ankle/Foot - Ankle Impingement:  Posterior Ankle Impingement Test: Negative.   Anterior Ankle Impingement Test: Positive.          Leg/Ankle/Foot - Ankle Instability:  Flexibility Test:  Positive.   Anterior Drawer Test:  Negative.   Talar Tilt Test:  Positive.   External Rotation Kleiger Plantar Flexion Test: Positive  External Rotation Kleiger Dorsiflexion Test:  Positive  Squeeze Test:  Positive  Dorsiflexion Test:  Negative.   Posterior Tibial Instability Test: Negative.  Peroneal Tendon Instability Test:  Negative.  Horizontal Squeeze Test:  Positive.   Vertical Squeeze Test:  Positive.   Standing/Walking Test:  Positive, Painful.         Leg/Ankle/Foot - DVT:  Taya's " Sign: Negative.         Leg/Ankle/Foot - Forefoot:  Strunsky Test:  Negative.   Gaenslen Maneuver Test:  Negative.   Metatarsal Tap Test: Negative.     Crepitation Test:  Negative.         Leg/Ankle/Foot - Hindfoot Achilles/Calcaneus:  Corrigan Compression Test: Negative.   Hoffa Sign: Negative.   Achilles Tendon Tap Test: Negative.   Heel Compression Test: Negative.         Leg/Ankle/Foot - Nerve Irritation:  Evy Sign: Negative.   Digital Nerve Stretch Test: Negative.   Tinel Sign: Negative.   Tourniquet Sign: Negative.         Feet/Foot:   Positive BILATERAL  Valgus foot     Imaging and Diagnostics Review:  Plain radiographs ordered during today's visit and independently reviewed.    Assessment   1. Sprain of anterior talofibular ligament, right, subsequent encounter    2. Sprain of calcaneofibular ligament of right ankle, subsequent encounter    3. Stress reaction of foot with routine healing, right    4. Right foot pain    5. Calcaneal spur, right    6. 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 Acute right ankle pain    8. High ankle sprain, right, subsequent encounter    9. Peroneal tendinitis, right    10. Tendinitis of right flexor hallucis longus          Plan   Treatment or Intervention:  May use PRICE therapy as needed.  Start and continue Physical Therapy 1-2 times a week for 8-10 weeks with manual therapy as well as dry needling and IASTM  Again stressed the importance of wearing shoes with good stability control to help with the biomechanics affecting the lower legs  Again stressed the importance of wearing full foot insoles to help with the biomechanics affecting the lower legs  Recommendation over-the-counter calcium 500mg, 3 times a day with vitamin-D3 4330-8759+ units a day with food as well as a daily multivitamin  Recommendation over-the-counter Move Free for joint health  May take OTC Tylenol Extra Strength or OTC Tylenol Arthritis, taking one every 6-8 hours with food as needed  for pain management.  Patient advised regarding the risk and/or potential adverse reactions and/or side effects of any prescribed medications along with any over-the-counter medications or any supplements used. Patient advised to seek immediate medical care if any adverse reactions occur. The patient and/or patient(s) parent(s) verbalized their understanding.  Discussed in detail with the patient to the level of their understanding the possibility in the future of regenerative injections versus corticosteroids injections  Reviewed RIGHT ANKLE MRI in detail with the patient and/or patients parent/legal guardian to their level of understanding; a copy of these results were provided to the patient and/or patients parent/legal guardian at the time of this office visit.     Diagnostic studies:  MR ankle right wo IV contrast  Narrative: Interpreted By:  Sebastián Gaston,   STUDY: MR ANKLE RIGHT WO IV CONTRAST; ;  2/4/2025 8:07 am      INDICATION: Signs/Symptoms:RIGHT ANKLE PAIN.      COMPARISON:  X-ray 12/20/2024      ACCESSION NUMBER(S):  [id number]      ORDERING CLINICIAN:  SAM MARROQUIN      TECHNIQUE:  Multiplanar multisequence MRI obtained of the right ankle.      FINDINGS:  Postsurgical changes are demonstrated status post curettage of lesion  in the distal tibial metaphysis with extension into the epiphysis  about the subchondral bone with signal void secondary to cement  placement in the area of resection. There is no surrounding marrow  edema about the some non placement.      Lateral collateral ligaments demonstrate sequela of chronic sprain of  the anterior inferior tibiofibular ligament with osseous irregularity  about the tibial attachment with hypertrophic appearance. This is in  the distribution of the previously noted bone curettage and packing.  No surrounding reactive marrow or soft tissue edema about the  chronically thickened tibiofibular ligament. Distal interosseous  tibiofibular ligament  visualized portions intact. Posterior inferior  tibiofibular ligament intact.      Anterior talofibular ligament demonstrates sequela of chronic sprain  with osseous irregularity about the fibular attachment. There is mild  surrounding periligamentous edema with mild acute on chronic sprain.  Calcaneofibular ligament is intact with low-grade sprain  demonstrated. Posterior talofibular ligament is intact.      Posterior tibiotalar portion deep deltoid ligament is intact.  Visualized components superficial deltoid ligament are intact. Spring  ligament complex is intact.      Sinus tarsi is unremarkable. Subtalar joint is unremarkable. There is  mild interosseous ganglion cyst formation about the angle just seen.  Ankle joint demonstrates no effusion. There is mild osteoarthritic  degenerative change with thinning of the articular cartilage      Posterior tibial tendon is intact the navicular tuberosity. Distal  tendon slips intact. No tenosynovitis. Flexor digitorum and flexor  hallucis longus tendons are unremarkable. Tarsal tunnel is  unremarkable.      Peroneus brevis tendon is intact to the base of the 5th metatarsal.  Peroneus longus tendon demonstrates mild tendinosis. No  tenosynovitis. Tibialis anterior and extensor tendons are intact.      Achilles tendon is intact. There is mild tendinosis. Mild chronic  enthesopathy about the calcaneal tuberosity insertion. There is mild  marrow edema within the posterior calcaneal tuberosity laterally with  superimposed component mild stress reaction suggested. Plantar fascia  demonstrates chronic thickened central and lateral cords with sequela  of chronic plantar fasciitis with calcaneal spurring demonstrated.  Musculature of the foot demonstrates no asymmetric atrophy or edema.  Midfoot osseous structures demonstrate component of intertarsal  osteoarthritis. There is mild 2nd and 3rd tarsometatarsal joint  osteoarthritis.      Impression:   1. Low-grade sprain of the  anterior talofibular and calcaneofibular  ligaments with component of mild acute on chronic sprain      2. Mild marrow edema within the posterior calcaneal tuberosity  laterally with stress reaction of concern. No discrete stress  fracture.      3. Chronic sprain of the anterior inferior tibiofibular ligament with  osseous protuberance about the fibular attachment. This is in the  distribution of the previously noted curettage and cement placement.  No focal marrow edema.          MACRO:  None      Signed by: Sebastián Gaston 2/4/2025 9:29 AM  Dictation workstation:   [id number]     Activity Instructions, Restrictions, and Accommodations:      Consultations/Referrals:  Physical therapy    Follow-up:  Follow up 6-8 weeks  Total appointment time _30_ minutes. Greater than 50% spent counseling patient on results of physical exam, treatment options as well as results of ordered imaging and treatment for results, need for PT and expected outcomes, as well as discussing possible medications.       Paul Jimenez CNP

## 2025-02-11 ENCOUNTER — DOCUMENTATION (OUTPATIENT)
Dept: PHYSICAL THERAPY | Facility: CLINIC | Age: 60
End: 2025-02-11
Payer: COMMERCIAL

## 2025-02-11 ENCOUNTER — APPOINTMENT (OUTPATIENT)
Dept: PHYSICAL THERAPY | Facility: CLINIC | Age: 60
End: 2025-02-11
Payer: COMMERCIAL

## 2025-02-11 NOTE — PROGRESS NOTES
Physical Therapy Evaluation and Treatment     Patient Name: Yariel Diana  MRN: [id number]  Encounter date: 2/11/2025       Visit # Visit count could not be calculated. Make sure you are using a visit which is associated with an episode. of ***  Visits/Dates Authorized: [place] [employer] - NO AUTH / $400 DEDUCT MET / 80% coins / $900 OOP not met / [place] VISITS /   VISITS - NO VISIT LIMIT - MUST BE CONCLUDED WITHIN 90 DAYS     Current Problem:   Problem List Items Addressed This Visit    None    Precautions:          Subjective Evaluation    History of Present Illness  Mechanism of injury: 59 year old male presenting with primary complaint of R foot/ankle pain. Symptoms began in early December 2024 when he slipped in the basement. The following morning he had pain in the heel which then moved to the toes and then wrapped to the top of the foot. Patient then re-aggravated his foot/ankle pain while changing a flat tire a few weeks later. Pt had x-rays on 12/20/24 which were negative for acute injury or fracture. Denies any numbness or tingling. 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 pain first occurred he was using a walker to help him around the house.  We discussed treatment options.      Patient Goals  Patient goals for therapy: decreased pain, increased motion and increased strength            Objective    Vitals:   WFL         Balance:  {Balance Assessments:00385}    Other Outcome Measures:       Treatments:     Therapeutic Exercise 06711:     Neuromuscular Re-Ed 89954:     Therapeutic Activity 83234:     Gait Training 56637:     Manual Therapy 38750:     Modalities:   {Modalities Used:46078}      HEP / Access Codes:       Assessment/Plan   {Complexities:55394}     Post-Treatment Symptoms:       Goals:   1) Pt will report pain levels no greater than 2/10 at worst with activity for less difficulty with standing, walking, squatting, negotiating stairs.   2) Pt will display  improved pain-free right ankle AROM WFL for less difficulty walking, squatting, negotiating stairs.  3) Pt will display pain-free right ankle MMT of 5/5 in all planes for improved functional strength with walking, transferring, squatting, and negotiating stairs.  4) Pt will display symmetrical pain-free gait without compensation.  5) Pt will perform pain-free single leg stance without loss of balance for at least 15 seconds to indicate improved static balance and proprioception.  6) Pt will improve HEATH score to less than 10% impairment to indicate minimal ankle dysfunction.

## 2025-02-11 NOTE — PROGRESS NOTES
Physical Therapy                 Therapy Communication Note    Patient Name: Yariel Diana  MRN: [id number]  Encounter Date: 2/11/2025    Discipline: Physical Therapy    Missed Time: No Show    Comment:

## 2025-02-16 ENCOUNTER — ANESTHESIA EVENT (OUTPATIENT)
Dept: OPERATING ROOM | Facility: HOSPITAL | Age: 60
End: 2025-02-16
Payer: COMMERCIAL

## 2025-02-17 ENCOUNTER — HOSPITAL ENCOUNTER (OUTPATIENT)
Dept: OPERATING ROOM | Facility: HOSPITAL | Age: 60
Setting detail: OUTPATIENT SURGERY
Discharge: HOME | End: 2025-02-17
Payer: COMMERCIAL

## 2025-02-17 ENCOUNTER — ANESTHESIA (OUTPATIENT)
Dept: OPERATING ROOM | Facility: HOSPITAL | Age: 60
End: 2025-02-17
Payer: COMMERCIAL

## 2025-02-17 VITALS
WEIGHT: 315 LBS | HEART RATE: 53 BPM | TEMPERATURE: 98.1 F | DIASTOLIC BLOOD PRESSURE: 89 MMHG | HEIGHT: 70 IN | BODY MASS INDEX: 45.1 KG/M2 | OXYGEN SATURATION: 97 % | SYSTOLIC BLOOD PRESSURE: 110 MMHG | RESPIRATION RATE: 16 BRPM

## 2025-02-17 DIAGNOSIS — Z12.11 ENCOUNTER FOR SCREENING FOR MALIGNANT NEOPLASM OF COLON: ICD-10-CM

## 2025-02-17 PROCEDURE — 2500000004 HC RX 250 GENERAL PHARMACY W/ HCPCS (ALT 636 FOR OP/ED): Performed by: ANESTHESIOLOGY

## 2025-02-17 PROCEDURE — 2500000004 HC RX 250 GENERAL PHARMACY W/ HCPCS (ALT 636 FOR OP/ED): Performed by: NURSE ANESTHETIST, CERTIFIED REGISTERED

## 2025-02-17 PROCEDURE — 45385 COLONOSCOPY W/LESION REMOVAL: CPT | Performed by: INTERNAL MEDICINE

## 2025-02-17 PROCEDURE — 45380 COLONOSCOPY AND BIOPSY: CPT | Performed by: INTERNAL MEDICINE

## 2025-02-17 PROCEDURE — 3700000002 HC GENERAL ANESTHESIA TIME - EACH INCREMENTAL 1 MINUTE: Performed by: STUDENT IN AN ORGANIZED HEALTH CARE EDUCATION/TRAINING PROGRAM

## 2025-02-17 PROCEDURE — A45385 PR COLONOSCOPY,REMV LESN,SNARE: Performed by: STUDENT IN AN ORGANIZED HEALTH CARE EDUCATION/TRAINING PROGRAM

## 2025-02-17 PROCEDURE — 7100000009 HC PHASE TWO TIME - INITIAL BASE CHARGE: Performed by: STUDENT IN AN ORGANIZED HEALTH CARE EDUCATION/TRAINING PROGRAM

## 2025-02-17 PROCEDURE — 3600000002 HC OR TIME - INITIAL BASE CHARGE - PROCEDURE LEVEL TWO: Performed by: STUDENT IN AN ORGANIZED HEALTH CARE EDUCATION/TRAINING PROGRAM

## 2025-02-17 PROCEDURE — 0753T DGTZ GLS MCRSCP SLD LEVEL IV: CPT | Mod: TC,GEALAB | Performed by: INTERNAL MEDICINE

## 2025-02-17 PROCEDURE — 3600000007 HC OR TIME - EACH INCREMENTAL 1 MINUTE - PROCEDURE LEVEL TWO: Performed by: STUDENT IN AN ORGANIZED HEALTH CARE EDUCATION/TRAINING PROGRAM

## 2025-02-17 PROCEDURE — 3700000001 HC GENERAL ANESTHESIA TIME - INITIAL BASE CHARGE: Performed by: STUDENT IN AN ORGANIZED HEALTH CARE EDUCATION/TRAINING PROGRAM

## 2025-02-17 PROCEDURE — 7100000010 HC PHASE TWO TIME - EACH INCREMENTAL 1 MINUTE: Performed by: STUDENT IN AN ORGANIZED HEALTH CARE EDUCATION/TRAINING PROGRAM

## 2025-02-17 PROCEDURE — A45385 PR COLONOSCOPY,REMV LESN,SNARE: Performed by: NURSE ANESTHETIST, CERTIFIED REGISTERED

## 2025-02-17 RX ORDER — PROPOFOL 10 MG/ML
INJECTION, EMULSION INTRAVENOUS AS NEEDED
Status: DISCONTINUED | OUTPATIENT
Start: 2025-02-17 | End: 2025-02-17

## 2025-02-17 RX ORDER — ACETAMINOPHEN 325 MG/1
650 TABLET ORAL EVERY 4 HOURS PRN
Status: DISCONTINUED | OUTPATIENT
Start: 2025-02-17 | End: 2025-02-18 | Stop reason: HOSPADM

## 2025-02-17 RX ORDER — LIDOCAINE HYDROCHLORIDE 10 MG/ML
INJECTION, SOLUTION EPIDURAL; INFILTRATION; INTRACAUDAL; PERINEURAL AS NEEDED
Status: DISCONTINUED | OUTPATIENT
Start: 2025-02-17 | End: 2025-02-17

## 2025-02-17 RX ORDER — ALBUTEROL SULFATE 0.83 MG/ML
2.5 SOLUTION RESPIRATORY (INHALATION) ONCE AS NEEDED
Status: DISCONTINUED | OUTPATIENT
Start: 2025-02-17 | End: 2025-02-18 | Stop reason: HOSPADM

## 2025-02-17 RX ORDER — MIDAZOLAM HYDROCHLORIDE 1 MG/ML
INJECTION INTRAMUSCULAR; INTRAVENOUS AS NEEDED
Status: DISCONTINUED | OUTPATIENT
Start: 2025-02-17 | End: 2025-02-17

## 2025-02-17 RX ORDER — ONDANSETRON HYDROCHLORIDE 2 MG/ML
8 INJECTION, SOLUTION INTRAVENOUS ONCE
Status: DISCONTINUED | OUTPATIENT
Start: 2025-02-17 | End: 2025-02-18 | Stop reason: HOSPADM

## 2025-02-17 RX ORDER — SODIUM CHLORIDE, SODIUM LACTATE, POTASSIUM CHLORIDE, CALCIUM CHLORIDE 600; 310; 30; 20 MG/100ML; MG/100ML; MG/100ML; MG/100ML
100 INJECTION, SOLUTION INTRAVENOUS CONTINUOUS
Status: ACTIVE | OUTPATIENT
Start: 2025-02-17 | End: 2025-02-17

## 2025-02-17 RX ADMIN — PROPOFOL 40 MG: 10 INJECTION, EMULSION INTRAVENOUS at 12:17

## 2025-02-17 RX ADMIN — SODIUM CHLORIDE, POTASSIUM CHLORIDE, SODIUM LACTATE AND CALCIUM CHLORIDE 100 ML/HR: 600; 310; 30; 20 INJECTION, SOLUTION INTRAVENOUS at 11:06

## 2025-02-17 RX ADMIN — MIDAZOLAM HYDROCHLORIDE 2 MG: 1 INJECTION, SOLUTION INTRAMUSCULAR; INTRAVENOUS at 12:05

## 2025-02-17 RX ADMIN — PROPOFOL 140 MCG/KG/MIN: 10 INJECTION, EMULSION INTRAVENOUS at 12:13

## 2025-02-17 RX ADMIN — LIDOCAINE HYDROCHLORIDE 20 MG: 10 SOLUTION INTRAVENOUS at 12:12

## 2025-02-17 RX ADMIN — PROPOFOL 60 MG: 10 INJECTION, EMULSION INTRAVENOUS at 12:12

## 2025-02-17 ASSESSMENT — PAIN - FUNCTIONAL ASSESSMENT
PAIN_FUNCTIONAL_ASSESSMENT: 0-10

## 2025-02-17 ASSESSMENT — PAIN SCALES - GENERAL
PAINLEVEL_OUTOF10: 0 - NO PAIN

## 2025-02-17 ASSESSMENT — COLUMBIA-SUICIDE SEVERITY RATING SCALE - C-SSRS
6. HAVE YOU EVER DONE ANYTHING, STARTED TO DO ANYTHING, OR PREPARED TO DO ANYTHING TO END YOUR LIFE?: NO
2. HAVE YOU ACTUALLY HAD ANY THOUGHTS OF KILLING YOURSELF?: NO
1. IN THE PAST MONTH, HAVE YOU WISHED YOU WERE DEAD OR WISHED YOU COULD GO TO SLEEP AND NOT WAKE UP?: NO

## 2025-02-17 NOTE — ANESTHESIA POSTPROCEDURE EVALUATION
Patient: Yariel Diana    Procedure Summary       Date: 02/17/25 Room / Location: [facility] OR    Anesthesia Start: 1205 Anesthesia Stop: 1235    Procedure: COLONOSCOPY Diagnosis: Encounter for screening for malignant neoplasm of colon    Scheduled Providers: Freida Astudillo MD; James Rivas DO Responsible Provider: James Rivas DO    Anesthesia Type: MAC ASA Status: 2            Anesthesia Type: MAC    Vitals Value Taken Time   /72 02/17/25 1240   Temp 36.7 °C (98.1 °F) 02/17/25 1235   Pulse 59 02/17/25 1235   Resp 16 02/17/25 1235   SpO2 97 % 02/17/25 1245   Vitals shown include unfiled device data.    Anesthesia Post Evaluation    Patient location during evaluation: PACU  Patient participation: complete - patient participated  Level of consciousness: awake and alert  Pain management: adequate  Airway patency: patent  Cardiovascular status: acceptable  Respiratory status: acceptable  Hydration status: acceptable  Postoperative Nausea and Vomiting: none        No notable events documented.

## 2025-02-17 NOTE — DISCHARGE INSTRUCTIONS
Patient Instructions after a Colonoscopy      The anesthetics, sedatives or narcotics which were given to you today will be acting in your body for the next 24 hours, so you might feel a little sleepy or groggy.  This feeling should slowly wear off. Carefully read and follow the instructions.     You received sedation today:  - Do not drive or operate any machinery or power tools of any kind.   - No alcoholic beverages today, not even beer or wine.  - Do not make any important decisions or sign any legal documents.  - No over the counter medications that contain alcohol or that may cause drowsiness.  - Do not make any important decisions or sign any legal documents.  - Make sure you have someone with you for first 24 hours.    While it is common to experience mild to moderate abdominal distention, gas, or belching after your procedure, if any of these symptoms occur following discharge from the GI Lab or within one week of having your procedure, call the [facility] to be advised whether a visit to your nearest Urgent Care or Emergency Department is indicated.  Take this paper with you if you go.     - If you develop an allergic reaction to the medications that were given during your procedure such as difficulty breathing, rash, hives, severe nausea, vomiting or lightheadedness.  - If you experience chest pain, shortness of breath, severe abdominal pain, fevers and chills.  -If you develop signs and symptoms of bleeding such as blood in your spit, if your stools turn black, tarry, or bloody  - If you have not urinated within 8 hours following your procedure.  - If your IV site becomes painful, red, inflamed, or looks infected.    If you received a biopsy/polypectomy/sphincterotomy the following instructions apply below:    __ Do not use Aspirin containing products, non-steroidal medications or anti-coagulants for one week following your procedure. (Examples of these types of medications are: Advil,  Arthrotec, Aleve, Coumadin, Ecotrin, Heparin, Ibuprofen, Indocin, Motrin, Naprosyn, Nuprin, Plavix, Vioxx, and Voltarin, or their generic forms.  This list is not all-inclusive.  Check with your physician or pharmacist before resuming medications.)   __ Eat a soft diet today.  Avoid foods that are poorly digested for the next 24 hours.  These foods would include: nuts, beans, lettuce, red meats, and fried foods. Start with liquids and advance your diet as tolerated, gradually work up to eating solids.   __ Do not have a Barium Study or Enema for one week.    Your physician recommends the additional following instructions:    -You have a contact number available for emergencies. The signs and symptoms of potential delayed complications were discussed with you. You may return to normal activities tomorrow.  -Resume your previous diet.  -Continue your present medications.   -We are waiting for your pathology results.  -Your physician has recommended a repeat colonoscopy (date to be determined after pending pathology results are reviewed) for surveillance based on pathology results.  -The findings and recommendations have been discussed with you.  -The findings and recommendations were discussed with your family.  - Please see Medication Reconciliation Form for new medication/medications prescribed.       If you experience any problems or have any questions following discharge from the GI Lab, please call:        Nurse Signature                                                                        Date___________________                                                                            Patient/Responsible Party Signature                                        Date___________________

## 2025-02-17 NOTE — ANESTHESIA PREPROCEDURE EVALUATION
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       Clinical information reviewed:   Tobacco  Allergies  Meds   Med Hx  Surg Hx   Fam Hx  Soc Hx        NPO Detail:  NPO/Void Status  Date of Last Liquid: 02/17/25  Time of Last Liquid: 0730  Date of Last Solid: 02/15/25         Physical Exam    Airway  Mallampati: II  TM distance: >3 FB  Neck ROM: full     Cardiovascular - normal exam     Dental    Pulmonary - normal exam     Abdominal            Anesthesia Plan    History of general anesthesia?: yes  History of complications of general anesthesia?: no    ASA 2     MAC     intravenous induction   Anesthetic plan and risks discussed with patient.  Use of blood products discussed with patient who.    Plan discussed with CRNA and attending.

## 2025-02-20 LAB
LABORATORY COMMENT REPORT: NORMAL
PATH REPORT.FINAL DX SPEC: NORMAL
PATH REPORT.GROSS SPEC: NORMAL
PATH REPORT.RELEVANT HX SPEC: NORMAL
PATH REPORT.TOTAL CANCER: NORMAL

## 2025-03-03 ENCOUNTER — APPOINTMENT (OUTPATIENT)
Dept: PHYSICAL THERAPY | Facility: CLINIC | Age: 60
End: 2025-03-03
Payer: COMMERCIAL

## 2025-04-08 DIAGNOSIS — E78.2 MIXED HYPERLIPIDEMIA: ICD-10-CM

## 2025-04-08 DIAGNOSIS — I10 PRIMARY HYPERTENSION: ICD-10-CM

## 2025-04-08 RX ORDER — ATENOLOL AND CHLORTHALIDONE TABLET 50; 25 MG/1; MG/1
1 TABLET ORAL DAILY
Qty: 90 TABLET | Refills: 0 | Status: SHIPPED | OUTPATIENT
Start: 2025-04-08

## 2025-04-08 RX ORDER — ATORVASTATIN CALCIUM 10 MG/1
10 TABLET, FILM COATED ORAL DAILY
Qty: 90 TABLET | Refills: 0 | Status: SHIPPED | OUTPATIENT
Start: 2025-04-08

## 2025-06-12 ENCOUNTER — HOSPITAL ENCOUNTER (OUTPATIENT)
Dept: RADIOLOGY | Facility: CLINIC | Age: 60
Discharge: HOME | End: 2025-06-12
Payer: COMMERCIAL

## 2025-06-12 ENCOUNTER — OFFICE VISIT (OUTPATIENT)
Dept: PRIMARY CARE | Facility: CLINIC | Age: 60
End: 2025-06-12
Payer: COMMERCIAL

## 2025-06-12 VITALS
BODY MASS INDEX: 45.1 KG/M2 | HEART RATE: 51 BPM | WEIGHT: 315 LBS | SYSTOLIC BLOOD PRESSURE: 136 MMHG | HEIGHT: 70 IN | OXYGEN SATURATION: 96 % | DIASTOLIC BLOOD PRESSURE: 84 MMHG

## 2025-06-12 DIAGNOSIS — Z13.29 SCREENING FOR THYROID DISORDER: ICD-10-CM

## 2025-06-12 DIAGNOSIS — I10 PRIMARY HYPERTENSION: ICD-10-CM

## 2025-06-12 DIAGNOSIS — M25.50 POLYARTHRALGIA: ICD-10-CM

## 2025-06-12 DIAGNOSIS — Z11.4 SCREENING FOR HUMAN IMMUNODEFICIENCY VIRUS: ICD-10-CM

## 2025-06-12 DIAGNOSIS — R79.9 ABNORMAL FINDING OF BLOOD CHEMISTRY, UNSPECIFIED: ICD-10-CM

## 2025-06-12 DIAGNOSIS — Z12.11 SCREENING FOR COLON CANCER: ICD-10-CM

## 2025-06-12 DIAGNOSIS — Z13.1 SCREENING FOR DIABETES MELLITUS: ICD-10-CM

## 2025-06-12 DIAGNOSIS — E55.9 VITAMIN D DEFICIENCY: ICD-10-CM

## 2025-06-12 DIAGNOSIS — Z11.59 NEED FOR HEPATITIS C SCREENING TEST: ICD-10-CM

## 2025-06-12 DIAGNOSIS — E78.2 MIXED HYPERLIPIDEMIA: ICD-10-CM

## 2025-06-12 DIAGNOSIS — Z12.5 SCREENING FOR MALIGNANT NEOPLASM OF PROSTATE: ICD-10-CM

## 2025-06-12 DIAGNOSIS — Z00.00 WELLNESS EXAMINATION: Primary | ICD-10-CM

## 2025-06-12 DIAGNOSIS — Z13.6 ENCOUNTER FOR SCREENING FOR CARDIOVASCULAR DISORDERS: ICD-10-CM

## 2025-06-12 DIAGNOSIS — Z11.59 SCREENING FOR VIRAL DISEASE: ICD-10-CM

## 2025-06-12 PROCEDURE — 3079F DIAST BP 80-89 MM HG: CPT | Performed by: FAMILY MEDICINE

## 2025-06-12 PROCEDURE — 3008F BODY MASS INDEX DOCD: CPT | Performed by: FAMILY MEDICINE

## 2025-06-12 PROCEDURE — 99396 PREV VISIT EST AGE 40-64: CPT | Performed by: FAMILY MEDICINE

## 2025-06-12 PROCEDURE — 3075F SYST BP GE 130 - 139MM HG: CPT | Performed by: FAMILY MEDICINE

## 2025-06-12 PROCEDURE — 73130 X-RAY EXAM OF HAND: CPT | Mod: 50

## 2025-06-12 PROCEDURE — 99214 OFFICE O/P EST MOD 30 MIN: CPT | Performed by: FAMILY MEDICINE

## 2025-06-12 PROCEDURE — 1036F TOBACCO NON-USER: CPT | Performed by: FAMILY MEDICINE

## 2025-06-12 PROCEDURE — 73130 X-RAY EXAM OF HAND: CPT | Mod: BILATERAL PROCEDURE | Performed by: RADIOLOGY

## 2025-06-12 RX ORDER — ATORVASTATIN CALCIUM 10 MG/1
10 TABLET, FILM COATED ORAL DAILY
Qty: 90 TABLET | Refills: 3 | Status: SHIPPED | OUTPATIENT
Start: 2025-06-12 | End: 2026-06-12

## 2025-06-12 RX ORDER — MELOXICAM 15 MG/1
15 TABLET ORAL DAILY
Qty: 90 TABLET | Refills: 1 | Status: SHIPPED | OUTPATIENT
Start: 2025-06-12 | End: 2025-12-09

## 2025-06-12 RX ORDER — ATENOLOL AND CHLORTHALIDONE TABLET 50; 25 MG/1; MG/1
1 TABLET ORAL DAILY
Qty: 90 TABLET | Refills: 3 | Status: SHIPPED | OUTPATIENT
Start: 2025-06-12 | End: 2026-06-12

## 2025-06-12 ASSESSMENT — PAIN SCALES - GENERAL: PAINLEVEL_OUTOF10: 0-NO PAIN

## 2025-06-12 ASSESSMENT — ENCOUNTER SYMPTOMS
DEPRESSION: 0
LOSS OF SENSATION IN FEET: 0
OCCASIONAL FEELINGS OF UNSTEADINESS: 0

## 2025-06-12 ASSESSMENT — PATIENT HEALTH QUESTIONNAIRE - PHQ9
2. FEELING DOWN, DEPRESSED OR HOPELESS: NOT AT ALL
1. LITTLE INTEREST OR PLEASURE IN DOING THINGS: NOT AT ALL
SUM OF ALL RESPONSES TO PHQ9 QUESTIONS 1 AND 2: 0

## 2025-06-12 NOTE — PROGRESS NOTES
59-year presents to establish care for yearly physical follow chronic medical conditions new complaints    Health Maintenance:  Eats a standard American diet.  Exercises regularly.  Does not drink, smoke, use illicit substances.  Otherwise up-to-date on all routine health maintenance screenings.  Due for immunizations.  Due for yearly lab work.        Hypertension:  136/84 on current therapy no chest pain or shortness of breath    Hyperlipidemia:  CT calcium score in the low 100s 5 years ago currently on moderate intensity statin therapy goal LDL less than 100      Bilateral hand pain: Over the last year patient is been noticing he is getting increasing morning stiffness in his hands primarily in the MCPs and PIPs bilaterally in all 5 digits.  No known family history of rheumatoid arthritis or other inflammatory condition.  Does have good response to NSAIDs but has been taking NSAIDs fairly regularly      All pertinent positive symptoms are included in history of present illness.    All other systems have been reviewed and are negative and noncontributory to this patient's current ailments.      CONSTITUTIONAL - INAD. Not ill appearing.  SKIN - No lesions or rashes visualized. Good skin turgor.  HEENT- Head is atraumatic and normocephalic. Nasal turbinates are nonerythematous and without drainage. .  RESP - CTAB. No wheezing, rhonchi, or crackles.   CARDIAC - RRR. No murmurs, gallops, or rubs.  ABDOMEN - Soft, nontender, nondistended. No organomegaly.  NEURO - CNs 2-12 grossly intact.  PSYCH - Normal mood and affect  MUSCULOSKELETAL-no visual signs of synovitis or joint swelling    1. Wellness examination (Primary)  Health and wellness topics discussed today.  Recommended eating a varied and healthy diet and made dietary recommendations, also discussed exercise and exercising regularly 150 minutes a week.  Immunizations recommended and updated.  Health screening guidelines discussed with patient including possible  recommendations such as colon cancer screening, breast cancer screening, prostate cancer screening, lung cancer screening, bone densitometry, AAA screening, and other wellness topics.  Yearly lab work ordered/reviewed today.  - CBC and Auto Differential; Future  - Comprehensive Metabolic Panel; Future  - Lipid Panel; Future  - Hemoglobin A1C; Future  - TSH with reflex to Free T4 if abnormal; Future  - Vitamin D 25-Hydroxy,Total (for eval of Vitamin D levels); Future  - Prostate Specific Antigen, Screen; Future  - CBC and Auto Differential  - Comprehensive Metabolic Panel  - Lipid Panel  - Hemoglobin A1C  - TSH with reflex to Free T4 if abnormal  - Vitamin D 25-Hydroxy,Total (for eval of Vitamin D levels)  - Prostate Specific Antigen, Screen    2. Abnormal finding of blood chemistry, unspecified    - CBC and Auto Differential; Future  - Comprehensive Metabolic Panel; Future  - Lipid Panel; Future  - Hemoglobin A1C; Future  - TSH with reflex to Free T4 if abnormal; Future  - Vitamin D 25-Hydroxy,Total (for eval of Vitamin D levels); Future  - Prostate Specific Antigen, Screen; Future  - CBC and Auto Differential  - Comprehensive Metabolic Panel  - Lipid Panel  - Hemoglobin A1C  - TSH with reflex to Free T4 if abnormal  - Vitamin D 25-Hydroxy,Total (for eval of Vitamin D levels)  - Prostate Specific Antigen, Screen    3. Screening for thyroid disorder    - CBC and Auto Differential; Future  - Comprehensive Metabolic Panel; Future  - Lipid Panel; Future  - Hemoglobin A1C; Future  - TSH with reflex to Free T4 if abnormal; Future  - Vitamin D 25-Hydroxy,Total (for eval of Vitamin D levels); Future  - Prostate Specific Antigen, Screen; Future  - CBC and Auto Differential  - Comprehensive Metabolic Panel  - Lipid Panel  - Hemoglobin A1C  - TSH with reflex to Free T4 if abnormal  - Vitamin D 25-Hydroxy,Total (for eval of Vitamin D levels)  - Prostate Specific Antigen, Screen    4. Screening for diabetes mellitus    - CBC and  Auto Differential; Future  - Comprehensive Metabolic Panel; Future  - Lipid Panel; Future  - Hemoglobin A1C; Future  - TSH with reflex to Free T4 if abnormal; Future  - Vitamin D 25-Hydroxy,Total (for eval of Vitamin D levels); Future  - Prostate Specific Antigen, Screen; Future  - CBC and Auto Differential  - Comprehensive Metabolic Panel  - Lipid Panel  - Hemoglobin A1C  - TSH with reflex to Free T4 if abnormal  - Vitamin D 25-Hydroxy,Total (for eval of Vitamin D levels)  - Prostate Specific Antigen, Screen    5. Screening for viral disease    - CBC and Auto Differential; Future  - Comprehensive Metabolic Panel; Future  - Lipid Panel; Future  - Hemoglobin A1C; Future  - TSH with reflex to Free T4 if abnormal; Future  - Vitamin D 25-Hydroxy,Total (for eval of Vitamin D levels); Future  - Prostate Specific Antigen, Screen; Future  - CBC and Auto Differential  - Comprehensive Metabolic Panel  - Lipid Panel  - Hemoglobin A1C  - TSH with reflex to Free T4 if abnormal  - Vitamin D 25-Hydroxy,Total (for eval of Vitamin D levels)  - Prostate Specific Antigen, Screen    6. Encounter for screening for cardiovascular disorders    - CBC and Auto Differential; Future  - Comprehensive Metabolic Panel; Future  - Lipid Panel; Future  - Hemoglobin A1C; Future  - TSH with reflex to Free T4 if abnormal; Future  - Vitamin D 25-Hydroxy,Total (for eval of Vitamin D levels); Future  - Prostate Specific Antigen, Screen; Future  - CBC and Auto Differential  - Comprehensive Metabolic Panel  - Lipid Panel  - Hemoglobin A1C  - TSH with reflex to Free T4 if abnormal  - Vitamin D 25-Hydroxy,Total (for eval of Vitamin D levels)  - Prostate Specific Antigen, Screen    7. Screening for malignant neoplasm of prostate    - CBC and Auto Differential; Future  - Comprehensive Metabolic Panel; Future  - Lipid Panel; Future  - Hemoglobin A1C; Future  - TSH with reflex to Free T4 if abnormal; Future  - Vitamin D 25-Hydroxy,Total (for eval of Vitamin D  levels); Future  - Prostate Specific Antigen, Screen; Future  - CBC and Auto Differential  - Comprehensive Metabolic Panel  - Lipid Panel  - Hemoglobin A1C  - TSH with reflex to Free T4 if abnormal  - Vitamin D 25-Hydroxy,Total (for eval of Vitamin D levels)  - Prostate Specific Antigen, Screen    8. Screening for colon cancer    - CBC and Auto Differential; Future  - Comprehensive Metabolic Panel; Future  - Lipid Panel; Future  - Hemoglobin A1C; Future  - TSH with reflex to Free T4 if abnormal; Future  - Vitamin D 25-Hydroxy,Total (for eval of Vitamin D levels); Future  - Prostate Specific Antigen, Screen; Future  - CBC and Auto Differential  - Comprehensive Metabolic Panel  - Lipid Panel  - Hemoglobin A1C  - TSH with reflex to Free T4 if abnormal  - Vitamin D 25-Hydroxy,Total (for eval of Vitamin D levels)  - Prostate Specific Antigen, Screen    9. Need for hepatitis C screening test    - Hepatitis C antibody; Future  - CBC and Auto Differential; Future  - Comprehensive Metabolic Panel; Future  - Lipid Panel; Future  - Hemoglobin A1C; Future  - TSH with reflex to Free T4 if abnormal; Future  - Vitamin D 25-Hydroxy,Total (for eval of Vitamin D levels); Future  - Prostate Specific Antigen, Screen; Future  - Hepatitis C antibody  - CBC and Auto Differential  - Comprehensive Metabolic Panel  - Lipid Panel  - Hemoglobin A1C  - TSH with reflex to Free T4 if abnormal  - Vitamin D 25-Hydroxy,Total (for eval of Vitamin D levels)  - Prostate Specific Antigen, Screen    10. Vitamin D deficiency    - CBC and Auto Differential; Future  - Comprehensive Metabolic Panel; Future  - Lipid Panel; Future  - Hemoglobin A1C; Future  - TSH with reflex to Free T4 if abnormal; Future  - Vitamin D 25-Hydroxy,Total (for eval of Vitamin D levels); Future  - Prostate Specific Antigen, Screen; Future  - CBC and Auto Differential  - Comprehensive Metabolic Panel  - Lipid Panel  - Hemoglobin A1C  - TSH with reflex to Free T4 if abnormal  -  Vitamin D 25-Hydroxy,Total (for eval of Vitamin D levels)  - Prostate Specific Antigen, Screen    11. Screening for human immunodeficiency virus    - CBC and Auto Differential; Future  - Comprehensive Metabolic Panel; Future  - Lipid Panel; Future  - Hemoglobin A1C; Future  - TSH with reflex to Free T4 if abnormal; Future  - Vitamin D 25-Hydroxy,Total (for eval of Vitamin D levels); Future  - Prostate Specific Antigen, Screen; Future  - HIV 1/2 Antigen/Antibody Screen with Reflex to Confirmation; Future  - CBC and Auto Differential  - Comprehensive Metabolic Panel  - Lipid Panel  - Hemoglobin A1C  - TSH with reflex to Free T4 if abnormal  - Vitamin D 25-Hydroxy,Total (for eval of Vitamin D levels)  - Prostate Specific Antigen, Screen  - HIV 1/2 Antigen/Antibody Screen with Reflex to Confirmation    12. Primary hypertension  Had a discussion with the patient about hypertension.  Discussed the natural history and course of hypertension and need for controlling blood pressure.  Discussed the cost benefit of treating hypertension with medication and how lowering blood pressure to goal levels will help reduce the risk of heart attacks and strokes in the future.     I recommend lifestyle interventions including regular cardio aerobic exercise 30 minutes daily at least 3 times a week, hopefully more.  Recommend dietary interventions to help lower blood pressure.    Recommend patient get a blood pressure cuff and begin measuring blood pressure at home and keeping a log.  Advised the patient bring the log with them at their next visit so that we can find out the average blood pressure reading and determine whether or not the treatment is working.    If patient is already taking medication, I recommended continuing or changing medication depending on blood pressure control.    Appropriate lab work was ordered as needed for appropriate monitoring.  - CBC and Auto Differential; Future  - Comprehensive Metabolic Panel; Future  -  Lipid Panel; Future  - Hemoglobin A1C; Future  - TSH with reflex to Free T4 if abnormal; Future  - Vitamin D 25-Hydroxy,Total (for eval of Vitamin D levels); Future  - Prostate Specific Antigen, Screen; Future  - atenoloL-chlorthalidone (Tenoretic) 50-25 mg tablet; Take 1 tablet by mouth once daily.  Dispense: 90 tablet; Refill: 3  - CBC and Auto Differential  - Comprehensive Metabolic Panel  - Lipid Panel  - Hemoglobin A1C  - TSH with reflex to Free T4 if abnormal  - Vitamin D 25-Hydroxy,Total (for eval of Vitamin D levels)  - Prostate Specific Antigen, Screen    13. Mixed hyperlipidemia  Discussed with patient about the natural history and course of hyperlipidemia.      Recommend lifestyle interventions including lower carbohydrate, lower saturated fat diet as well as increasing aerobic and resistance training exercise to at least 30 minutes daily 3 times a week, hopefully more.    Discussed medication options including the use of statin medications as well as the side effects of these medications.  Discussed goal LDL of <100 for primary prevention and <70 for secondary prevention.    Discussed the cost benefit analysis of lowering cholesterol and how it will help prevent heart attacks and strokes in the future.     Discussed secondary risk stratification with CT Cardiac Scoring and utilizing the PREVENT calculator to determine if statin use will benefit patient on an individual basis, and may have discussed an informed shared decision to de-prescribe statin if patient may not be likely benefitting from one.     If medication was prescribed or continued, the appropriate lab work was ordered for monitoring.  - CBC and Auto Differential; Future  - Comprehensive Metabolic Panel; Future  - Lipid Panel; Future  - Hemoglobin A1C; Future  - TSH with reflex to Free T4 if abnormal; Future  - Vitamin D 25-Hydroxy,Total (for eval of Vitamin D levels); Future  - Prostate Specific Antigen, Screen; Future  - atorvastatin  (Lipitor) 10 mg tablet; Take 1 tablet (10 mg) by mouth once daily.  Dispense: 90 tablet; Refill: 3  - CBC and Auto Differential  - Comprehensive Metabolic Panel  - Lipid Panel  - Hemoglobin A1C  - TSH with reflex to Free T4 if abnormal  - Vitamin D 25-Hydroxy,Total (for eval of Vitamin D levels)  - Prostate Specific Antigen, Screen    14. Polyarthralgia  Will get arthritis panel, trial meloxicam, x-rays of bilateral hands, follow-up if not improving  - Rheumatoid Factor; Future  - C-Reactive Protein; Future  - Sedimentation Rate; Future  - Uric Acid; Future  - MARYANNE with Reflex to DYLAN; Future  - Citrulline Antibody, IgG; Future  - XR hand left 1-2 views; Future  - XR hand right 1-2 views; Future  - meloxicam (Mobic) 15 mg tablet; Take 1 tablet (15 mg) by mouth once daily.  Dispense: 90 tablet; Refill: 1  - Rheumatoid Factor  - C-Reactive Protein  - Sedimentation Rate  - Uric Acid  - MARYANNE with Reflex to DYLAN  - Citrulline Antibody, IgG

## 2025-06-13 NOTE — RESULT ENCOUNTER NOTE
X-ray of the hand reveals mild to moderate osteoarthritis in the bilateral hands greatest at the first carpometacarpal joint/base of the thumb.

## 2025-06-19 LAB
25(OH)D3+25(OH)D2 SERPL-MCNC: 34 NG/ML (ref 30–100)
ALBUMIN SERPL-MCNC: 4.5 G/DL (ref 3.6–5.1)
ALP SERPL-CCNC: 37 U/L (ref 35–144)
ALT SERPL-CCNC: 37 U/L (ref 9–46)
ANA SER QL IF: NEGATIVE
ANION GAP SERPL CALCULATED.4IONS-SCNC: 9 MMOL/L (CALC) (ref 7–17)
AST SERPL-CCNC: 25 U/L (ref 10–35)
BASOPHILS # BLD AUTO: 57 CELLS/UL (ref 0–200)
BASOPHILS NFR BLD AUTO: 1 %
BILIRUB SERPL-MCNC: 0.5 MG/DL (ref 0.2–1.2)
BUN SERPL-MCNC: 18 MG/DL (ref 7–25)
CALCIUM SERPL-MCNC: 9.6 MG/DL (ref 8.6–10.3)
CCP IGG SERPL-ACNC: <16 UNITS
CHLORIDE SERPL-SCNC: 102 MMOL/L (ref 98–110)
CHOLEST SERPL-MCNC: 124 MG/DL
CHOLEST/HDLC SERPL: 3.8 (CALC)
CO2 SERPL-SCNC: 31 MMOL/L (ref 20–32)
CREAT SERPL-MCNC: 1.01 MG/DL (ref 0.7–1.3)
CRP SERPL-MCNC: <3 MG/L
EGFRCR SERPLBLD CKD-EPI 2021: 86 ML/MIN/1.73M2
EOSINOPHIL # BLD AUTO: 348 CELLS/UL (ref 15–500)
EOSINOPHIL NFR BLD AUTO: 6.1 %
ERYTHROCYTE [DISTWIDTH] IN BLOOD BY AUTOMATED COUNT: 12.6 % (ref 11–15)
ERYTHROCYTE [SEDIMENTATION RATE] IN BLOOD BY WESTERGREN METHOD: 2 MM/H
EST. AVERAGE GLUCOSE BLD GHB EST-MCNC: 117 MG/DL
EST. AVERAGE GLUCOSE BLD GHB EST-SCNC: 6.5 MMOL/L
GLUCOSE SERPL-MCNC: 105 MG/DL (ref 65–99)
HBA1C MFR BLD: 5.7 %
HCT VFR BLD AUTO: 44.5 % (ref 38.5–50)
HCV AB SERPL QL IA: NORMAL
HDLC SERPL-MCNC: 33 MG/DL
HGB BLD-MCNC: 14.9 G/DL (ref 13.2–17.1)
HIV 1+2 AB+HIV1 P24 AG SERPL QL IA: NORMAL
LDLC SERPL CALC-MCNC: 75 MG/DL (CALC)
LYMPHOCYTES # BLD AUTO: 1784 CELLS/UL (ref 850–3900)
LYMPHOCYTES NFR BLD AUTO: 31.3 %
MCH RBC QN AUTO: 30.7 PG (ref 27–33)
MCHC RBC AUTO-ENTMCNC: 33.5 G/DL (ref 32–36)
MCV RBC AUTO: 91.8 FL (ref 80–100)
MONOCYTES # BLD AUTO: 610 CELLS/UL (ref 200–950)
MONOCYTES NFR BLD AUTO: 10.7 %
NEUTROPHILS # BLD AUTO: 2901 CELLS/UL (ref 1500–7800)
NEUTROPHILS NFR BLD AUTO: 50.9 %
NONHDLC SERPL-MCNC: 91 MG/DL (CALC)
PLATELET # BLD AUTO: 295 THOUSAND/UL (ref 140–400)
PMV BLD REES-ECKER: 9.4 FL (ref 7.5–12.5)
POTASSIUM SERPL-SCNC: 4 MMOL/L (ref 3.5–5.3)
PROT SERPL-MCNC: 7.5 G/DL (ref 6.1–8.1)
PSA SERPL-MCNC: 0.3 NG/ML
RBC # BLD AUTO: 4.85 MILLION/UL (ref 4.2–5.8)
RHEUMATOID FACT SERPL-ACNC: <10 IU/ML
SODIUM SERPL-SCNC: 142 MMOL/L (ref 135–146)
T4 FREE SERPL-MCNC: 1.1 NG/DL (ref 0.8–1.8)
TRIGL SERPL-MCNC: 79 MG/DL
TSH SERPL-ACNC: 5.86 MIU/L (ref 0.4–4.5)
URATE SERPL-MCNC: 9.7 MG/DL (ref 4–8)
WBC # BLD AUTO: 5.7 THOUSAND/UL (ref 3.8–10.8)

## 2025-07-04 ENCOUNTER — OFFICE VISIT (OUTPATIENT)
Dept: URGENT CARE | Age: 60
End: 2025-07-04
Payer: COMMERCIAL

## 2025-07-04 VITALS
SYSTOLIC BLOOD PRESSURE: 135 MMHG | OXYGEN SATURATION: 98 % | WEIGHT: 300 LBS | BODY MASS INDEX: 43.05 KG/M2 | DIASTOLIC BLOOD PRESSURE: 82 MMHG | TEMPERATURE: 98 F | HEART RATE: 50 BPM | RESPIRATION RATE: 18 BRPM

## 2025-07-04 DIAGNOSIS — M54.50 ACUTE RIGHT-SIDED LOW BACK PAIN WITHOUT SCIATICA: Primary | ICD-10-CM

## 2025-07-04 RX ORDER — CYCLOBENZAPRINE HCL 5 MG
5 TABLET ORAL 3 TIMES DAILY
Qty: 30 TABLET | Refills: 0 | Status: SHIPPED | OUTPATIENT
Start: 2025-07-04 | End: 2025-07-14

## 2025-07-04 RX ORDER — NAPROXEN 500 MG/1
500 TABLET ORAL
COMMUNITY
Start: 2025-07-02

## 2025-07-04 RX ORDER — KETOROLAC TROMETHAMINE 30 MG/ML
30 INJECTION, SOLUTION INTRAMUSCULAR; INTRAVENOUS ONCE
Status: COMPLETED | OUTPATIENT
Start: 2025-07-04 | End: 2025-07-04

## 2025-07-04 RX ADMIN — KETOROLAC TROMETHAMINE 30 MG: 30 INJECTION, SOLUTION INTRAMUSCULAR; INTRAVENOUS at 12:47

## 2025-07-04 ASSESSMENT — VISUAL ACUITY: OU: 1

## 2025-07-04 ASSESSMENT — ENCOUNTER SYMPTOMS
CHEST TIGHTNESS: 0
FREQUENCY: 0
DIARRHEA: 0
SORE THROAT: 0
DIZZINESS: 0
EYE PAIN: 0
MYALGIAS: 0
VOMITING: 0
CONSTIPATION: 0
ARTHRALGIAS: 0
FACIAL SWELLING: 0
WOUND: 0
HEADACHES: 0
SINUS PRESSURE: 0
FEVER: 0
NAUSEA: 0
SHORTNESS OF BREATH: 0
DIFFICULTY URINATING: 0
PALPITATIONS: 0
COUGH: 0
RHINORRHEA: 0
APPETITE CHANGE: 0
CHILLS: 0
SINUS PAIN: 0
ABDOMINAL PAIN: 0
JOINT SWELLING: 0
FATIGUE: 0
BACK PAIN: 1

## 2025-07-04 NOTE — PROGRESS NOTES
Subjective   Patient ID: Yariel Diana is a 59 y.o. male. They present today with a chief complaint of Back Pain (Patient is here for back pain- lumbar. Working in yard and has had pain for 1 week. Denies difficulties with using the bathroom, numbness tingling down the legs. ).    History of Present Illness  The patient is a 58yo male who presents with back pain- lumbar. Working in yard and has had pain for 1 week. Denies difficulties with using the bathroom, numbness tingling down the legs.           Back Pain  Pertinent negatives include no abdominal pain, chest pain, fever or headaches.       Past Medical History  Allergies as of 07/04/2025    (No Known Allergies)       Prescriptions Prior to Admission[1]     Medical History[2]    Surgical History[3]     reports that he has never smoked. He has never used smokeless tobacco. He reports current alcohol use. He reports that he does not use drugs.    Review of Systems  Review of Systems   Constitutional:  Negative for appetite change, chills, fatigue and fever.   HENT:  Negative for congestion, dental problem, drooling, ear discharge, ear pain, facial swelling, hearing loss, mouth sores, postnasal drip, rhinorrhea, sinus pressure, sinus pain, sneezing and sore throat.    Eyes:  Negative for pain.   Respiratory:  Negative for cough, chest tightness and shortness of breath.    Cardiovascular:  Negative for chest pain and palpitations.   Gastrointestinal:  Negative for abdominal pain, constipation, diarrhea, nausea and vomiting.   Genitourinary:  Negative for decreased urine volume, difficulty urinating, enuresis, frequency and urgency.   Musculoskeletal:  Positive for back pain. Negative for arthralgias, gait problem, joint swelling and myalgias.   Skin:  Negative for rash and wound.   Neurological:  Negative for dizziness and headaches.   Psychiatric/Behavioral:  Negative for self-injury and suicidal ideas.                                   Objective     Vitals:    07/04/25 1148   BP: 135/82   BP Location: Left arm   Patient Position: Sitting   BP Cuff Size: Large adult   Pulse: 50   Resp: 18   Temp: 36.7 °C (98 °F)   TempSrc: Oral   SpO2: 98%   Weight: 136 kg (300 lb)     No LMP for male patient.    Physical Exam  Vitals reviewed.   Constitutional:       General: He is awake.      Appearance: Normal appearance. He is well-developed, well-groomed and normal weight. He is not ill-appearing.   HENT:      Head: Normocephalic and atraumatic.   Eyes:      General: Lids are normal. Vision grossly intact.   Cardiovascular:      Rate and Rhythm: Normal rate and regular rhythm.      Heart sounds: Normal heart sounds, S1 normal and S2 normal. Heart sounds not distant. No murmur heard.     No friction rub. No gallop.   Pulmonary:      Effort: Pulmonary effort is normal. No tachypnea, bradypnea, accessory muscle usage, prolonged expiration, respiratory distress or retractions.      Breath sounds: Normal breath sounds and air entry. No stridor, decreased air movement or transmitted upper airway sounds. No decreased breath sounds, wheezing, rhonchi or rales.   Chest:      Chest wall: No deformity.   Musculoskeletal:      Cervical back: Normal.      Thoracic back: Normal.      Lumbar back: Tenderness present. No swelling. Normal range of motion.      Comments: Tenderness right side L4-S1   Skin:     General: Skin is warm and dry.      Capillary Refill: Capillary refill takes less than 2 seconds.   Neurological:      General: No focal deficit present.      Mental Status: He is alert.   Psychiatric:         Attention and Perception: Attention and perception normal.         Mood and Affect: Mood and affect normal.         Speech: Speech normal.         Behavior: Behavior normal. Behavior is cooperative.         Procedures    Point of Care Test & Imaging Results from this visit  No results found for this visit on 07/04/25.   Imaging  No results found.    Cardiology, Vascular, and  Other Imaging  No other imaging results found for the past 2 days      Diagnostic study results (if any) were reviewed by ONOFRE Moise.    Assessment/Plan   Allergies, medications, history, and pertinent labs/EKGs/Imaging reviewed by ONOFRE Moise.     Medical Decision Making  Low back pain L4-S1 on right side. Patient given toradol injection and muscle relaxer for home. Patient given back exercises. Patient to follow up with pcp or ortho if symptoms do not imp[rove with treatment.     Risks, benefits, and alternatives of the medications and treatment plan prescribed today were discussed, and patient expressed understanding. Plan follow up as discussed or as needed if any worsening symptoms or change in condition. Reinforced red flags including (but not limited to): severe or worsening abdominal pain; difficulty swallowing; stiff neck; shortness of breath; coughing or vomiting blood; chest pain; and new or increased fever are indications to go to the Emergency Department.    The patient voices understanding of all medications. No barriers to adherence. Patient is taking all medications as prescribed and tolerating well. For any new medications, the patient was instructed of directions for and consequences of not taking medication and they were informed about the potential side effects and drug interactions. The after-visit summary was given to the patient and care instructions were reviewed with the patient. All questions were answered and the patient verbalized understanding of the plan of care for today.    Orders and Diagnoses  There are no diagnoses linked to this encounter.    Medical Admin Record      Patient disposition: Home    Electronically signed by ONOFRE Moise  11:53 AM             [1] (Not in a hospital admission)  [2] No past medical history on file.  [3] No past surgical history on file.

## 2025-08-22 ENCOUNTER — HOSPITAL ENCOUNTER (EMERGENCY)
Facility: HOSPITAL | Age: 60
Discharge: HOME | End: 2025-08-23
Payer: COMMERCIAL

## 2025-08-22 ENCOUNTER — APPOINTMENT (OUTPATIENT)
Dept: RADIOLOGY | Facility: HOSPITAL | Age: 60
End: 2025-08-22
Payer: COMMERCIAL

## 2025-08-22 VITALS
WEIGHT: 285 LBS | HEIGHT: 70 IN | HEART RATE: 58 BPM | DIASTOLIC BLOOD PRESSURE: 86 MMHG | OXYGEN SATURATION: 96 % | SYSTOLIC BLOOD PRESSURE: 164 MMHG | TEMPERATURE: 98.6 F | BODY MASS INDEX: 40.8 KG/M2 | RESPIRATION RATE: 18 BRPM

## 2025-08-22 DIAGNOSIS — M79.604 PAIN IN RIGHT LEG: Primary | ICD-10-CM

## 2025-08-22 PROCEDURE — 73590 X-RAY EXAM OF LOWER LEG: CPT | Mod: RT

## 2025-08-22 PROCEDURE — 93971 EXTREMITY STUDY: CPT | Performed by: RADIOLOGY

## 2025-08-22 PROCEDURE — 72170 X-RAY EXAM OF PELVIS: CPT

## 2025-08-22 PROCEDURE — 73590 X-RAY EXAM OF LOWER LEG: CPT | Mod: RIGHT SIDE | Performed by: RADIOLOGY

## 2025-08-22 PROCEDURE — 93971 EXTREMITY STUDY: CPT

## 2025-08-22 PROCEDURE — 73552 X-RAY EXAM OF FEMUR 2/>: CPT | Mod: RT

## 2025-08-22 PROCEDURE — 73552 X-RAY EXAM OF FEMUR 2/>: CPT | Mod: RIGHT SIDE | Performed by: RADIOLOGY

## 2025-08-22 PROCEDURE — 2500000001 HC RX 250 WO HCPCS SELF ADMINISTERED DRUGS (ALT 637 FOR MEDICARE OP): Performed by: PHYSICIAN ASSISTANT

## 2025-08-22 PROCEDURE — 99284 EMERGENCY DEPT VISIT MOD MDM: CPT | Mod: 25

## 2025-08-22 PROCEDURE — 72170 X-RAY EXAM OF PELVIS: CPT | Mod: RIGHT SIDE | Performed by: RADIOLOGY

## 2025-08-22 RX ORDER — IBUPROFEN 600 MG/1
600 TABLET, FILM COATED ORAL ONCE
Status: COMPLETED | OUTPATIENT
Start: 2025-08-22 | End: 2025-08-22

## 2025-08-22 RX ORDER — ACETAMINOPHEN 325 MG/1
975 TABLET ORAL ONCE
Status: COMPLETED | OUTPATIENT
Start: 2025-08-22 | End: 2025-08-22

## 2025-08-22 RX ADMIN — IBUPROFEN 600 MG: 600 TABLET ORAL at 23:17

## 2025-08-22 RX ADMIN — ACETAMINOPHEN 975 MG: 325 TABLET ORAL at 23:17

## 2025-08-22 ASSESSMENT — PAIN SCALES - GENERAL: PAINLEVEL_OUTOF10: 5 - MODERATE PAIN

## 2025-08-22 ASSESSMENT — PAIN - FUNCTIONAL ASSESSMENT: PAIN_FUNCTIONAL_ASSESSMENT: 0-10

## 2025-08-23 RX ORDER — METHYLPREDNISOLONE 4 MG/1
TABLET ORAL
Qty: 21 TABLET | Refills: 0 | Status: SHIPPED | OUTPATIENT
Start: 2025-08-23 | End: 2025-08-30

## 2025-08-23 RX ORDER — ACETAMINOPHEN 325 MG/1
650 TABLET ORAL EVERY 6 HOURS PRN
Qty: 30 TABLET | Refills: 0 | Status: SHIPPED | OUTPATIENT
Start: 2025-08-23 | End: 2025-09-02

## 2025-08-23 RX ORDER — NAPROXEN 500 MG/1
500 TABLET ORAL
Qty: 14 TABLET | Refills: 0 | Status: SHIPPED | OUTPATIENT
Start: 2025-08-23 | End: 2025-08-30

## 2025-08-23 RX ORDER — METHOCARBAMOL 500 MG/1
500 TABLET, FILM COATED ORAL 2 TIMES DAILY
Qty: 20 TABLET | Refills: 0 | Status: SHIPPED | OUTPATIENT
Start: 2025-08-23 | End: 2025-09-02

## 2025-08-23 ASSESSMENT — PAIN - FUNCTIONAL ASSESSMENT: PAIN_FUNCTIONAL_ASSESSMENT: 0-10

## 2025-08-23 ASSESSMENT — PAIN SCALES - GENERAL: PAINLEVEL_OUTOF10: 0 - NO PAIN
